# Patient Record
Sex: FEMALE | Race: OTHER | NOT HISPANIC OR LATINO | URBAN - METROPOLITAN AREA
[De-identification: names, ages, dates, MRNs, and addresses within clinical notes are randomized per-mention and may not be internally consistent; named-entity substitution may affect disease eponyms.]

---

## 2017-12-05 ENCOUNTER — EMERGENCY (EMERGENCY)
Facility: HOSPITAL | Age: 27
LOS: 1 days | Discharge: HOME | End: 2017-12-05
Attending: EMERGENCY MEDICINE | Admitting: EMERGENCY MEDICINE
Payer: COMMERCIAL

## 2017-12-05 VITALS
SYSTOLIC BLOOD PRESSURE: 130 MMHG | DIASTOLIC BLOOD PRESSURE: 77 MMHG | WEIGHT: 121.25 LBS | OXYGEN SATURATION: 99 % | HEART RATE: 105 BPM | TEMPERATURE: 98 F | RESPIRATION RATE: 18 BRPM

## 2017-12-05 VITALS
TEMPERATURE: 98 F | SYSTOLIC BLOOD PRESSURE: 109 MMHG | OXYGEN SATURATION: 99 % | DIASTOLIC BLOOD PRESSURE: 68 MMHG | RESPIRATION RATE: 18 BRPM | HEART RATE: 80 BPM

## 2017-12-05 DIAGNOSIS — O20.9 HEMORRHAGE IN EARLY PREGNANCY, UNSPECIFIED: ICD-10-CM

## 2017-12-05 DIAGNOSIS — O46.8X1 OTHER ANTEPARTUM HEMORRHAGE, FIRST TRIMESTER: ICD-10-CM

## 2017-12-05 DIAGNOSIS — Z3A.08 8 WEEKS GESTATION OF PREGNANCY: ICD-10-CM

## 2017-12-05 LAB
ALBUMIN SERPL ELPH-MCNC: 4.1 G/DL — SIGNIFICANT CHANGE UP (ref 3.3–5)
ALP SERPL-CCNC: 80 U/L — SIGNIFICANT CHANGE UP (ref 40–120)
ALT FLD-CCNC: 9 U/L — LOW (ref 10–45)
ANION GAP SERPL CALC-SCNC: 16 MMOL/L — SIGNIFICANT CHANGE UP (ref 5–17)
APPEARANCE UR: (no result)
APTT BLD: 29.6 SEC — SIGNIFICANT CHANGE UP (ref 27.5–37.4)
AST SERPL-CCNC: 17 U/L — SIGNIFICANT CHANGE UP (ref 10–40)
BASOPHILS NFR BLD AUTO: 0.1 % — SIGNIFICANT CHANGE UP (ref 0–2)
BILIRUB SERPL-MCNC: 0.3 MG/DL — SIGNIFICANT CHANGE UP (ref 0.2–1.2)
BILIRUB UR-MCNC: NEGATIVE — SIGNIFICANT CHANGE UP
BLD GP AB SCN SERPL QL: NEGATIVE — SIGNIFICANT CHANGE UP
BUN SERPL-MCNC: 8 MG/DL — SIGNIFICANT CHANGE UP (ref 7–23)
CALCIUM SERPL-MCNC: 9.6 MG/DL — SIGNIFICANT CHANGE UP (ref 8.4–10.5)
CHLORIDE SERPL-SCNC: 100 MMOL/L — SIGNIFICANT CHANGE UP (ref 96–108)
CO2 SERPL-SCNC: 19 MMOL/L — LOW (ref 22–31)
COLOR SPEC: (no result)
CREAT SERPL-MCNC: 0.41 MG/DL — LOW (ref 0.5–1.3)
DIFF PNL FLD: (no result)
EOSINOPHIL NFR BLD AUTO: 1.2 % — SIGNIFICANT CHANGE UP (ref 0–6)
GLUCOSE SERPL-MCNC: 72 MG/DL — SIGNIFICANT CHANGE UP (ref 70–99)
GLUCOSE UR QL: NEGATIVE — SIGNIFICANT CHANGE UP
HCG SERPL-ACNC: HIGH MIU/ML
HCT VFR BLD CALC: 36.6 % — SIGNIFICANT CHANGE UP (ref 34.5–45)
HGB BLD-MCNC: 12.2 G/DL — SIGNIFICANT CHANGE UP (ref 11.5–15.5)
INR BLD: 0.91 — SIGNIFICANT CHANGE UP (ref 0.88–1.16)
KETONES UR-MCNC: NEGATIVE — SIGNIFICANT CHANGE UP
LEUKOCYTE ESTERASE UR-ACNC: NEGATIVE — SIGNIFICANT CHANGE UP
LYMPHOCYTES # BLD AUTO: 20.5 % — SIGNIFICANT CHANGE UP (ref 13–44)
MCHC RBC-ENTMCNC: 30.1 PG — SIGNIFICANT CHANGE UP (ref 27–34)
MCHC RBC-ENTMCNC: 33.3 G/DL — SIGNIFICANT CHANGE UP (ref 32–36)
MCV RBC AUTO: 90.4 FL — SIGNIFICANT CHANGE UP (ref 80–100)
MONOCYTES NFR BLD AUTO: 7 % — SIGNIFICANT CHANGE UP (ref 2–14)
NEUTROPHILS NFR BLD AUTO: 71.2 % — SIGNIFICANT CHANGE UP (ref 43–77)
NITRITE UR-MCNC: NEGATIVE — SIGNIFICANT CHANGE UP
PH UR: 6 — SIGNIFICANT CHANGE UP (ref 5–8)
PLATELET # BLD AUTO: 190 K/UL — SIGNIFICANT CHANGE UP (ref 150–400)
POTASSIUM SERPL-MCNC: 4.3 MMOL/L — SIGNIFICANT CHANGE UP (ref 3.5–5.3)
POTASSIUM SERPL-SCNC: 4.3 MMOL/L — SIGNIFICANT CHANGE UP (ref 3.5–5.3)
PROT SERPL-MCNC: 7.7 G/DL — SIGNIFICANT CHANGE UP (ref 6–8.3)
PROT UR-MCNC: >=300 MG/DL
PROTHROM AB SERPL-ACNC: 10.1 SEC — SIGNIFICANT CHANGE UP (ref 9.8–12.7)
RBC # BLD: 4.05 M/UL — SIGNIFICANT CHANGE UP (ref 3.8–5.2)
RBC # FLD: 12.9 % — SIGNIFICANT CHANGE UP (ref 10.3–16.9)
RH IG SCN BLD-IMP: POSITIVE — SIGNIFICANT CHANGE UP
SODIUM SERPL-SCNC: 135 MMOL/L — SIGNIFICANT CHANGE UP (ref 135–145)
SP GR SPEC: 1.02 — SIGNIFICANT CHANGE UP (ref 1–1.03)
UROBILINOGEN FLD QL: 0.2 E.U./DL — SIGNIFICANT CHANGE UP
WBC # BLD: 16.9 K/UL — HIGH (ref 3.8–10.5)
WBC # FLD AUTO: 16.9 K/UL — HIGH (ref 3.8–10.5)

## 2017-12-05 PROCEDURE — 85610 PROTHROMBIN TIME: CPT

## 2017-12-05 PROCEDURE — 76817 TRANSVAGINAL US OBSTETRIC: CPT | Mod: 26

## 2017-12-05 PROCEDURE — 76801 OB US < 14 WKS SINGLE FETUS: CPT

## 2017-12-05 PROCEDURE — 99285 EMERGENCY DEPT VISIT HI MDM: CPT

## 2017-12-05 PROCEDURE — 87086 URINE CULTURE/COLONY COUNT: CPT

## 2017-12-05 PROCEDURE — 86900 BLOOD TYPING SEROLOGIC ABO: CPT

## 2017-12-05 PROCEDURE — 76801 OB US < 14 WKS SINGLE FETUS: CPT | Mod: 26,59

## 2017-12-05 PROCEDURE — 85730 THROMBOPLASTIN TIME PARTIAL: CPT

## 2017-12-05 PROCEDURE — 85025 COMPLETE CBC W/AUTO DIFF WBC: CPT

## 2017-12-05 PROCEDURE — 84702 CHORIONIC GONADOTROPIN TEST: CPT

## 2017-12-05 PROCEDURE — 80053 COMPREHEN METABOLIC PANEL: CPT

## 2017-12-05 PROCEDURE — 36415 COLL VENOUS BLD VENIPUNCTURE: CPT

## 2017-12-05 PROCEDURE — 86901 BLOOD TYPING SEROLOGIC RH(D): CPT

## 2017-12-05 PROCEDURE — 76817 TRANSVAGINAL US OBSTETRIC: CPT

## 2017-12-05 PROCEDURE — 81001 URINALYSIS AUTO W/SCOPE: CPT

## 2017-12-05 PROCEDURE — 99284 EMERGENCY DEPT VISIT MOD MDM: CPT | Mod: 25

## 2017-12-05 PROCEDURE — 86850 RBC ANTIBODY SCREEN: CPT

## 2017-12-05 NOTE — ED PROVIDER NOTE - OBJECTIVE STATEMENT
25 y/o female who is  with estimated 8 weeks GA is present with vaginal bleeding x1 day. Pt states that she went to the bathroom earlier today and noticed a large blood clot in the toilet. Pt denies abdominal pain, pelvic pain, recent trauma, sexual intercourse today. Denies past hx of blood clots or fam hx of blood clots.

## 2017-12-05 NOTE — ED PROVIDER NOTE - ATTENDING CONTRIBUTION TO CARE
27 yo  with vaginal bleeding, no abd pain, syncope, lightheadedness, recent sexual intercourse. NAD, RRR, CTAB, no abdominal tenderness, no focal neuro deficits. Labs, US reviewed, currently hds, advised close fu with gyn regarding results, small sc hematoma likely cause of bleeding,  pelvic rest advised, Discussed with pt results of work up, strict return precautions, and need for follow up.  Pt expressed understanding and agrees with plan.

## 2017-12-05 NOTE — ED PROVIDER NOTE - CARE PLAN
Principal Discharge DX:	Subchorionic hemorrhage  Instructions for follow-up, activity and diet:	Please refrain from strenuous physical activities, pelvic rest and follow up with Dr. Armstrong

## 2017-12-05 NOTE — ED ADULT NURSE NOTE - OBJECTIVE STATEMENT
states vaginal bleeding since this morning. denies abd pain.  states she is 8 weeks pregnant.  +hematuria noted.  denies dizziness, chest pain, palpitations, sob.

## 2017-12-05 NOTE — ED PROVIDER NOTE - MEDICAL DECISION MAKING DETAILS
25 y/o female with vaginal bleeding. US shows subchorionic hemorrhage. Spoke with PA at Dr. Armstrong' office. Pt is safe for dc with fu with OBGYN fu.

## 2017-12-06 LAB
CULTURE RESULTS: NO GROWTH — SIGNIFICANT CHANGE UP
SPECIMEN SOURCE: SIGNIFICANT CHANGE UP

## 2017-12-07 ENCOUNTER — EMERGENCY (EMERGENCY)
Facility: HOSPITAL | Age: 27
LOS: 1 days | Discharge: HOME | End: 2017-12-07
Attending: EMERGENCY MEDICINE | Admitting: EMERGENCY MEDICINE
Payer: COMMERCIAL

## 2017-12-07 VITALS
HEART RATE: 89 BPM | TEMPERATURE: 98 F | DIASTOLIC BLOOD PRESSURE: 68 MMHG | OXYGEN SATURATION: 99 % | RESPIRATION RATE: 16 BRPM | SYSTOLIC BLOOD PRESSURE: 111 MMHG

## 2017-12-07 VITALS
DIASTOLIC BLOOD PRESSURE: 73 MMHG | HEART RATE: 97 BPM | RESPIRATION RATE: 20 BRPM | TEMPERATURE: 98 F | OXYGEN SATURATION: 98 % | SYSTOLIC BLOOD PRESSURE: 104 MMHG

## 2017-12-07 DIAGNOSIS — O20.9 HEMORRHAGE IN EARLY PREGNANCY, UNSPECIFIED: ICD-10-CM

## 2017-12-07 DIAGNOSIS — N93.9 ABNORMAL UTERINE AND VAGINAL BLEEDING, UNSPECIFIED: ICD-10-CM

## 2017-12-07 DIAGNOSIS — Z3A.12 12 WEEKS GESTATION OF PREGNANCY: ICD-10-CM

## 2017-12-07 LAB
ALBUMIN SERPL ELPH-MCNC: 3.9 G/DL — SIGNIFICANT CHANGE UP (ref 3.3–5)
ALP SERPL-CCNC: 73 U/L — SIGNIFICANT CHANGE UP (ref 40–120)
ALT FLD-CCNC: 9 U/L — LOW (ref 10–45)
ANION GAP SERPL CALC-SCNC: 14 MMOL/L — SIGNIFICANT CHANGE UP (ref 5–17)
APTT BLD: 29 SEC — SIGNIFICANT CHANGE UP (ref 27.5–37.4)
AST SERPL-CCNC: 14 U/L — SIGNIFICANT CHANGE UP (ref 10–40)
BASOPHILS NFR BLD AUTO: 0.1 % — SIGNIFICANT CHANGE UP (ref 0–2)
BILIRUB SERPL-MCNC: 0.3 MG/DL — SIGNIFICANT CHANGE UP (ref 0.2–1.2)
BLD GP AB SCN SERPL QL: NEGATIVE — SIGNIFICANT CHANGE UP
BUN SERPL-MCNC: 7 MG/DL — SIGNIFICANT CHANGE UP (ref 7–23)
CALCIUM SERPL-MCNC: 9.4 MG/DL — SIGNIFICANT CHANGE UP (ref 8.4–10.5)
CHLORIDE SERPL-SCNC: 99 MMOL/L — SIGNIFICANT CHANGE UP (ref 96–108)
CO2 SERPL-SCNC: 20 MMOL/L — LOW (ref 22–31)
CREAT SERPL-MCNC: 0.43 MG/DL — LOW (ref 0.5–1.3)
EOSINOPHIL NFR BLD AUTO: 1.2 % — SIGNIFICANT CHANGE UP (ref 0–6)
GLUCOSE SERPL-MCNC: 92 MG/DL — SIGNIFICANT CHANGE UP (ref 70–99)
HCG SERPL-ACNC: HIGH MIU/ML
HCT VFR BLD CALC: 32.8 % — LOW (ref 34.5–45)
HGB BLD-MCNC: 11.1 G/DL — LOW (ref 11.5–15.5)
INR BLD: 0.92 — SIGNIFICANT CHANGE UP (ref 0.88–1.16)
LYMPHOCYTES # BLD AUTO: 17.8 % — SIGNIFICANT CHANGE UP (ref 13–44)
MCHC RBC-ENTMCNC: 30.2 PG — SIGNIFICANT CHANGE UP (ref 27–34)
MCHC RBC-ENTMCNC: 33.8 G/DL — SIGNIFICANT CHANGE UP (ref 32–36)
MCV RBC AUTO: 89.4 FL — SIGNIFICANT CHANGE UP (ref 80–100)
MONOCYTES NFR BLD AUTO: 6.5 % — SIGNIFICANT CHANGE UP (ref 2–14)
NEUTROPHILS NFR BLD AUTO: 74.4 % — SIGNIFICANT CHANGE UP (ref 43–77)
PLATELET # BLD AUTO: 190 K/UL — SIGNIFICANT CHANGE UP (ref 150–400)
POTASSIUM SERPL-MCNC: 4.2 MMOL/L — SIGNIFICANT CHANGE UP (ref 3.5–5.3)
POTASSIUM SERPL-SCNC: 4.2 MMOL/L — SIGNIFICANT CHANGE UP (ref 3.5–5.3)
PROT SERPL-MCNC: 7.1 G/DL — SIGNIFICANT CHANGE UP (ref 6–8.3)
PROTHROM AB SERPL-ACNC: 10.2 SEC — SIGNIFICANT CHANGE UP (ref 9.8–12.7)
RBC # BLD: 3.67 M/UL — LOW (ref 3.8–5.2)
RBC # FLD: 13 % — SIGNIFICANT CHANGE UP (ref 10.3–16.9)
RH IG SCN BLD-IMP: POSITIVE — SIGNIFICANT CHANGE UP
SODIUM SERPL-SCNC: 133 MMOL/L — LOW (ref 135–145)
WBC # BLD: 16.2 K/UL — HIGH (ref 3.8–10.5)
WBC # FLD AUTO: 16.2 K/UL — HIGH (ref 3.8–10.5)

## 2017-12-07 PROCEDURE — 80053 COMPREHEN METABOLIC PANEL: CPT

## 2017-12-07 PROCEDURE — 86901 BLOOD TYPING SEROLOGIC RH(D): CPT

## 2017-12-07 PROCEDURE — 85730 THROMBOPLASTIN TIME PARTIAL: CPT

## 2017-12-07 PROCEDURE — 99284 EMERGENCY DEPT VISIT MOD MDM: CPT | Mod: 25

## 2017-12-07 PROCEDURE — 86850 RBC ANTIBODY SCREEN: CPT

## 2017-12-07 PROCEDURE — 85025 COMPLETE CBC W/AUTO DIFF WBC: CPT

## 2017-12-07 PROCEDURE — 85610 PROTHROMBIN TIME: CPT

## 2017-12-07 PROCEDURE — 84702 CHORIONIC GONADOTROPIN TEST: CPT

## 2017-12-07 PROCEDURE — 36415 COLL VENOUS BLD VENIPUNCTURE: CPT

## 2017-12-07 PROCEDURE — 99053 MED SERV 10PM-8AM 24 HR FAC: CPT

## 2017-12-07 PROCEDURE — 86900 BLOOD TYPING SEROLOGIC ABO: CPT

## 2017-12-07 RX ORDER — ACETAMINOPHEN 500 MG
650 TABLET ORAL ONCE
Qty: 0 | Refills: 0 | Status: COMPLETED | OUTPATIENT
Start: 2017-12-07 | End: 2017-12-07

## 2017-12-07 RX ADMIN — Medication 650 MILLIGRAM(S): at 01:54

## 2017-12-07 NOTE — ED PROVIDER NOTE - OBJECTIVE STATEMENT
26F , 12wks pregnant c/o vaginal bleeding.  some blood clots and mild lower abd cramping.  no fevers. vomited once.  no trauma.  was seen in ED yesterday - had US showing live IUP and mall to moderate subchorionic hematoma.

## 2017-12-07 NOTE — ED PROVIDER NOTE - PROGRESS NOTE DETAILS
bedside education US - transabdominal - +live IUP, +, +fetal movement slight drop in HCG.  spoke with pt regarding that subchorionic hemorrhage places increased risk of miscarriage. advised pelvic rest, no heavy lifting. pt has Ob appt tomorrow.   I have discussed the discharge plan with the patient. The patient agrees with the plan, as discussed.  The patient understands Emergency Department diagnosis is a preliminary diagnosis often based on limited information and that the patient must adhere to the follow-up plan as discussed.  The patient understands that if the symptoms worsen the patient may return to the Emergency Department at any time for further evaluation and treatment.

## 2017-12-07 NOTE — ED ADULT TRIAGE NOTE - CHIEF COMPLAINT QUOTE
pt is 3 months pregnant pt c/o vaginal bleeding since yesterday pt was seen here last night and was diagnosed with subchorionic hematoma , today bleeding is heavier , pt also c/o nausea and had one episode of vomiting, pt also c/o weakness

## 2017-12-11 ENCOUNTER — EMERGENCY (EMERGENCY)
Facility: HOSPITAL | Age: 27
LOS: 1 days | Discharge: HOME | End: 2017-12-11
Attending: EMERGENCY MEDICINE | Admitting: EMERGENCY MEDICINE
Payer: COMMERCIAL

## 2017-12-11 VITALS
DIASTOLIC BLOOD PRESSURE: 67 MMHG | SYSTOLIC BLOOD PRESSURE: 119 MMHG | TEMPERATURE: 98 F | HEART RATE: 85 BPM | OXYGEN SATURATION: 100 % | RESPIRATION RATE: 18 BRPM

## 2017-12-11 VITALS
SYSTOLIC BLOOD PRESSURE: 123 MMHG | RESPIRATION RATE: 18 BRPM | OXYGEN SATURATION: 100 % | HEART RATE: 110 BPM | DIASTOLIC BLOOD PRESSURE: 70 MMHG | TEMPERATURE: 98 F

## 2017-12-11 PROCEDURE — 99053 MED SERV 10PM-8AM 24 HR FAC: CPT

## 2017-12-11 PROCEDURE — 99284 EMERGENCY DEPT VISIT MOD MDM: CPT | Mod: 25

## 2017-12-11 NOTE — ED PROVIDER NOTE - MEDICAL DECISION MAKING DETAILS
discussed that symptoms again represent threatened ab and are related to subchorionic hematoma  unfortunately, no intervention is available to change course at this time but reinterated rest and nothing intravaginal.  .  return for increased symptoms but expect continued spotting and intermittent discomfort.  rh+ and recent neg urine culture.  hemodynamically stable.

## 2017-12-11 NOTE — ED ADULT NURSE NOTE - OBJECTIVE STATEMENT
Pt presents to ED c/o vaginal bleeding x 3 hours. Pt presents to ED c/o abd pain x 3 hours. Pt states she is 3 months pregnant. She recently has been diagnosed with pocket of blood on placenta. Pt states she expected to have vaginal bleeding.

## 2017-12-11 NOTE — ED ADULT TRIAGE NOTE - CHIEF COMPLAINT QUOTE
vaginal bleeding / abdominal pain for 3 hours .pt was seen and d/marie 12/7/ 17 DX  with Threatened Miscarriage. vaginal bleeding / abdominal pain for 3 hours .pt was seen and d/marie 12/7/ 17 DX  with Threatened Miscarriage. pt is 3 month pregnant .

## 2017-12-11 NOTE — ED PROVIDER NOTE - OBJECTIVE STATEMENT
[place] :   at 12+ weeks here with continued vaginal spotting and lower abd discomfort.  Known iup with subchorionic hematoma on prior us.  Seen here twice in past week for similar but states lower abd discomfort felt new/different.  Bleeding unchanged.  Followed up with her obgyn after last visit.  Wants to know if there is anything she can take for discomfort.  No fever or urinary symptoms.

## 2017-12-11 NOTE — ED ADULT NURSE NOTE - CHIEF COMPLAINT QUOTE
vaginal bleeding / abdominal pain for 3 hours .pt was seen and d/marie 12/7/ 17 DX  with Threatened Miscarriage. pt is 3 month pregnant .

## 2017-12-12 ENCOUNTER — EMERGENCY (EMERGENCY)
Facility: HOSPITAL | Age: 27
LOS: 1 days | Discharge: HOME | End: 2017-12-12
Attending: EMERGENCY MEDICINE | Admitting: EMERGENCY MEDICINE
Payer: COMMERCIAL

## 2017-12-12 VITALS
DIASTOLIC BLOOD PRESSURE: 71 MMHG | SYSTOLIC BLOOD PRESSURE: 127 MMHG | HEART RATE: 109 BPM | RESPIRATION RATE: 19 BRPM | OXYGEN SATURATION: 98 % | TEMPERATURE: 98 F

## 2017-12-12 VITALS
TEMPERATURE: 98 F | HEART RATE: 83 BPM | DIASTOLIC BLOOD PRESSURE: 68 MMHG | RESPIRATION RATE: 18 BRPM | SYSTOLIC BLOOD PRESSURE: 109 MMHG | OXYGEN SATURATION: 98 %

## 2017-12-12 DIAGNOSIS — Z79.899 OTHER LONG TERM (CURRENT) DRUG THERAPY: ICD-10-CM

## 2017-12-12 DIAGNOSIS — O20.9 HEMORRHAGE IN EARLY PREGNANCY, UNSPECIFIED: ICD-10-CM

## 2017-12-12 DIAGNOSIS — O03.9 COMPLETE OR UNSPECIFIED SPONTANEOUS ABORTION WITHOUT COMPLICATION: ICD-10-CM

## 2017-12-12 LAB
ALBUMIN SERPL ELPH-MCNC: 3.9 G/DL — SIGNIFICANT CHANGE UP (ref 3.3–5)
ALP SERPL-CCNC: 106 U/L — SIGNIFICANT CHANGE UP (ref 40–120)
ALT FLD-CCNC: 16 U/L — SIGNIFICANT CHANGE UP (ref 10–45)
ANION GAP SERPL CALC-SCNC: 16 MMOL/L — SIGNIFICANT CHANGE UP (ref 5–17)
APTT BLD: 29.4 SEC — SIGNIFICANT CHANGE UP (ref 27.5–37.4)
AST SERPL-CCNC: 23 U/L — SIGNIFICANT CHANGE UP (ref 10–40)
BASOPHILS NFR BLD AUTO: 0 % — SIGNIFICANT CHANGE UP (ref 0–2)
BILIRUB SERPL-MCNC: 0.4 MG/DL — SIGNIFICANT CHANGE UP (ref 0.2–1.2)
BLD GP AB SCN SERPL QL: NEGATIVE — SIGNIFICANT CHANGE UP
BUN SERPL-MCNC: 7 MG/DL — SIGNIFICANT CHANGE UP (ref 7–23)
CALCIUM SERPL-MCNC: 9.7 MG/DL — SIGNIFICANT CHANGE UP (ref 8.4–10.5)
CHLORIDE SERPL-SCNC: 93 MMOL/L — LOW (ref 96–108)
CO2 SERPL-SCNC: 20 MMOL/L — LOW (ref 22–31)
CREAT SERPL-MCNC: 0.45 MG/DL — LOW (ref 0.5–1.3)
EOSINOPHIL NFR BLD AUTO: 0.1 % — SIGNIFICANT CHANGE UP (ref 0–6)
GLUCOSE SERPL-MCNC: 90 MG/DL — SIGNIFICANT CHANGE UP (ref 70–99)
HCG SERPL-ACNC: HIGH MIU/ML
HCT VFR BLD CALC: 32 % — LOW (ref 34.5–45)
HGB BLD-MCNC: 11.1 G/DL — LOW (ref 11.5–15.5)
INR BLD: 1.01 — SIGNIFICANT CHANGE UP (ref 0.88–1.16)
LYMPHOCYTES # BLD AUTO: 6.7 % — LOW (ref 13–44)
MCHC RBC-ENTMCNC: 30.7 PG — SIGNIFICANT CHANGE UP (ref 27–34)
MCHC RBC-ENTMCNC: 34.7 G/DL — SIGNIFICANT CHANGE UP (ref 32–36)
MCV RBC AUTO: 88.6 FL — SIGNIFICANT CHANGE UP (ref 80–100)
MONOCYTES NFR BLD AUTO: 6 % — SIGNIFICANT CHANGE UP (ref 2–14)
NEUTROPHILS NFR BLD AUTO: 87.2 % — HIGH (ref 43–77)
PLATELET # BLD AUTO: 204 K/UL — SIGNIFICANT CHANGE UP (ref 150–400)
POTASSIUM SERPL-MCNC: 3.8 MMOL/L — SIGNIFICANT CHANGE UP (ref 3.5–5.3)
POTASSIUM SERPL-SCNC: 3.8 MMOL/L — SIGNIFICANT CHANGE UP (ref 3.5–5.3)
PROT SERPL-MCNC: 7.4 G/DL — SIGNIFICANT CHANGE UP (ref 6–8.3)
PROTHROM AB SERPL-ACNC: 11.2 SEC — SIGNIFICANT CHANGE UP (ref 9.8–12.7)
RBC # BLD: 3.61 M/UL — LOW (ref 3.8–5.2)
RBC # FLD: 13 % — SIGNIFICANT CHANGE UP (ref 10.3–16.9)
RH IG SCN BLD-IMP: POSITIVE — SIGNIFICANT CHANGE UP
SODIUM SERPL-SCNC: 129 MMOL/L — LOW (ref 135–145)
WBC # BLD: 27.6 K/UL — HIGH (ref 3.8–10.5)
WBC # FLD AUTO: 27.6 K/UL — HIGH (ref 3.8–10.5)

## 2017-12-12 PROCEDURE — 86850 RBC ANTIBODY SCREEN: CPT

## 2017-12-12 PROCEDURE — 96375 TX/PRO/DX INJ NEW DRUG ADDON: CPT

## 2017-12-12 PROCEDURE — 86900 BLOOD TYPING SEROLOGIC ABO: CPT

## 2017-12-12 PROCEDURE — 86901 BLOOD TYPING SEROLOGIC RH(D): CPT

## 2017-12-12 PROCEDURE — 85610 PROTHROMBIN TIME: CPT

## 2017-12-12 PROCEDURE — 96374 THER/PROPH/DIAG INJ IV PUSH: CPT

## 2017-12-12 PROCEDURE — 99284 EMERGENCY DEPT VISIT MOD MDM: CPT | Mod: 25

## 2017-12-12 PROCEDURE — 85025 COMPLETE CBC W/AUTO DIFF WBC: CPT

## 2017-12-12 PROCEDURE — 85730 THROMBOPLASTIN TIME PARTIAL: CPT

## 2017-12-12 PROCEDURE — 99285 EMERGENCY DEPT VISIT HI MDM: CPT | Mod: 25

## 2017-12-12 PROCEDURE — 99053 MED SERV 10PM-8AM 24 HR FAC: CPT

## 2017-12-12 PROCEDURE — 36415 COLL VENOUS BLD VENIPUNCTURE: CPT

## 2017-12-12 PROCEDURE — 84702 CHORIONIC GONADOTROPIN TEST: CPT

## 2017-12-12 PROCEDURE — 80053 COMPREHEN METABOLIC PANEL: CPT

## 2017-12-12 PROCEDURE — 87070 CULTURE OTHR SPECIMN AEROBIC: CPT

## 2017-12-12 RX ORDER — MORPHINE SULFATE 50 MG/1
2 CAPSULE, EXTENDED RELEASE ORAL ONCE
Qty: 0 | Refills: 0 | Status: DISCONTINUED | OUTPATIENT
Start: 2017-12-12 | End: 2017-12-12

## 2017-12-12 RX ORDER — ACETAMINOPHEN 500 MG
650 TABLET ORAL ONCE
Qty: 0 | Refills: 0 | Status: COMPLETED | OUTPATIENT
Start: 2017-12-12 | End: 2017-12-12

## 2017-12-12 RX ORDER — SODIUM CHLORIDE 9 MG/ML
1000 INJECTION INTRAMUSCULAR; INTRAVENOUS; SUBCUTANEOUS ONCE
Qty: 0 | Refills: 0 | Status: COMPLETED | OUTPATIENT
Start: 2017-12-12 | End: 2017-12-12

## 2017-12-12 RX ORDER — ONDANSETRON 8 MG/1
4 TABLET, FILM COATED ORAL ONCE
Qty: 0 | Refills: 0 | Status: COMPLETED | OUTPATIENT
Start: 2017-12-12 | End: 2017-12-12

## 2017-12-12 RX ORDER — ACETAMINOPHEN 500 MG
325 TABLET ORAL ONCE
Qty: 0 | Refills: 0 | Status: COMPLETED | OUTPATIENT
Start: 2017-12-12 | End: 2017-12-12

## 2017-12-12 RX ADMIN — ONDANSETRON 4 MILLIGRAM(S): 8 TABLET, FILM COATED ORAL at 01:57

## 2017-12-12 RX ADMIN — SODIUM CHLORIDE 2000 MILLILITER(S): 9 INJECTION INTRAMUSCULAR; INTRAVENOUS; SUBCUTANEOUS at 01:57

## 2017-12-12 RX ADMIN — Medication 325 MILLIGRAM(S): at 03:15

## 2017-12-12 RX ADMIN — SODIUM CHLORIDE 2000 MILLILITER(S): 9 INJECTION INTRAMUSCULAR; INTRAVENOUS; SUBCUTANEOUS at 03:20

## 2017-12-12 RX ADMIN — Medication 650 MILLIGRAM(S): at 01:58

## 2017-12-12 RX ADMIN — Medication 325 MILLIGRAM(S): at 05:24

## 2017-12-12 RX ADMIN — MORPHINE SULFATE 2 MILLIGRAM(S): 50 CAPSULE, EXTENDED RELEASE ORAL at 03:50

## 2017-12-12 RX ADMIN — Medication 650 MILLIGRAM(S): at 03:21

## 2017-12-12 NOTE — ED PROVIDER NOTE - ATTENDING CONTRIBUTION TO CARE
26F , 12wks, c/o worsening lower abd pain and cramping, +bleeding. pt with 3 visits prior showing subchorionic hemorrhage.    gen- nad  heent- ncat, clear conj  cv -rrr  lungs -ctab  abd - soft, suprapubic ttp  ext -wwp, no edema  neuro -aox3, steady gait, rubalcava  while in ED - pt with spontaneous passage of fetus and placenta. given ABX, will f/u with Ob

## 2017-12-12 NOTE — CONSULT NOTE ADULT - SUBJECTIVE AND OBJECTIVE BOX
26y             PAST MEDICAL & SURGICAL HISTORY:  No pertinent past medical history  No significant past surgical history      REVIEW OF SYSTEMS    neg except for hpi.     MEDICATIONS  (STANDING):  acetaminophen   Tablet. 325 milliGRAM(s) Oral once    MEDICATIONS  (PRN):      Allergies    No Known Allergies    Intolerances        SOCIAL HISTORY:    FAMILY HISTORY:      Vital Signs Last 24 Hrs  T(C): 36.6 (12 Dec 2017 00:48), Max: 36.9 (11 Dec 2017 06:36)  T(F): 97.9 (12 Dec 2017 00:48), Max: 98.5 (11 Dec 2017 06:36)  HR: 109 (12 Dec 2017 00:48) (85 - 110)  BP: 127/71 (12 Dec 2017 00:48) (119/67 - 127/71)  BP(mean): --  RR: 19 (12 Dec 2017 00:48) (18 - 19)  SpO2: 98% (12 Dec 2017 00:48) (98% - 100%)    Gen: NAD, AOx3  Chest: normal work of breathing  Abdomen: soft, nontender, nondistended, no rebound or guarding  Pelvic: cervix grossly normal, long and closed, no bleeding, normal vaginal discharge, no CMT or adnexal masses  Extremities: [facility]    LABS:                        11.1   27.6  )-----------( 204      ( 12 Dec 2017 01:43 )             32.0         129<L>  |  93<L>  |  7   ----------------------------<  90  3.8   |  20<L>  |  0.45<L>    Ca    9.7      12 Dec 2017 01:43    TPro  7.4  /  Alb  3.9  /  TBili  0.4  /  DBili  x   /  AST  23  /  ALT  16  /  AlkPhos  106  12-12    PT/INR - ( 12 Dec 2017 01:43 )   PT: 11.2 sec;   INR: 1.01          PTT - ( 12 Dec 2017 01:43 )  PTT:29.4 sec      RADIOLOGY & ADDITIONAL STUDIES: 26y  [id number] presents for increased vaginal bleeding and pelvic pain. Patient has come to the ER 3 times over the last week and visited her Ob yesterday, was told to expect continued bleeding. An ultrasound showed a subchorionic hemorrhage, small. Patient states the pain is much worse today, feels like contractions. Did not take anything for pain. +Nausea and vomiting for the past month. +Subjective fever today. Denies diarrhea, dysuria, abnormal vaginal discharge.     In the ER, patient passed fetus, placenta found in vagina and removed.    Obhx: first pregnancy  Gynhx: denies  PMH: denies  PSH: denies  Meds: pnv  NKDA  Sochx: denies tobacco, alcohol, drugs       PAST MEDICAL & SURGICAL HISTORY:  No pertinent past medical history  No significant past surgical history      REVIEW OF SYSTEMS    neg except for hpi.     MEDICATIONS  (STANDING):  acetaminophen   Tablet. 325 milliGRAM(s) Oral once    MEDICATIONS  (PRN):      Allergies    No Known Allergies    Intolerances        Vital Signs Last 24 Hrs  T(C): 36.6 (12 Dec 2017 00:48), Max: 36.9 (11 Dec 2017 06:36)  T(F): 97.9 (12 Dec 2017 00:48), Max: 98.5 (11 Dec 2017 06:36)  HR: 109 (12 Dec 2017 00:48) (85 - 110)  BP: 127/71 (12 Dec 2017 00:48) (119/67 - 127/71)  BP(mean): --  RR: 19 (12 Dec 2017 00:48) (18 - 19)  SpO2: 98% (12 Dec 2017 00:48) (98% - 100%)    Gen: NAD, AOx3  Chest: normal work of breathing  Abdomen: soft, tender bilateral lower quadrants, no rebound or guarding, nondistended  Pelvic: small blood clot, placenta sitting in vagina and manually removed with ease, minimal bleeding after removal of placenta  Extremities: [facility]  Bedside sono prior to delivery did not show an FH    LABS:                        11.1   27.6  )-----------( 204      ( 12 Dec 2017 01:43 )             32.0     12-    129<L>  |  93<L>  |  7   ----------------------------<  90  3.8   |  20<L>  |  0.45<L>    Ca    9.7      12 Dec 2017 01:43    TPro  7.4  /  Alb  3.9  /  TBili  0.4  /  DBili  x   /  AST  23  /  ALT  16  /  AlkPhos  106  12-12    PT/INR - ( 12 Dec 2017 01:43 )   PT: 11.2 sec;   INR: 1.01          PTT - ( 12 Dec 2017 01:43 )  PTT:29.4 sec    beta hCG [id number]

## 2017-12-12 NOTE — ED PROVIDER NOTE - OBJECTIVE STATEMENT
25 yo  @ 13 weeks gestation c/o worsening pelvic pain since last night. Associated with vaginal bleeding x 1 week and n/v. Pt seen in the ED on  and diagnosed with a subchorionic hemorrhage but had an IUP with a +FH. Pt seen again on  and  and there was a FH both times. Pt states pain is worsening. Denies fever, chills, diarrhea, back pain, dysuria.

## 2017-12-12 NOTE — ED PROVIDER NOTE - PROGRESS NOTE DETAILS
pt c/o severe cramping, pelvic exam performed again, Os open, uterus contracted. About 15 min later pt passed a formed fetus. Cord clamped and cut and POC saved. Gyn delivered the placenta. Recommends augmentin 875 BID x 2 weeks. Pt will f/u with her gyn.

## 2017-12-12 NOTE — ED PROVIDER NOTE - PHYSICAL EXAMINATION
CONSTITUTIONAL: pt lying in stretcher appears uncomfortable.   HEAD: Normocephalic; atraumatic.   EYES: PERRL; EOM intact; conjunctiva and sclera clear  ENT: normal nose; no rhinorrhea; normal pharynx with no erythema or lesions.   NECK: Supple; non-tender; no LAD  CARDIOVASCULAR: Normal S1, S2; no murmurs, rubs, or gallops. Regular rate and rhythm.   RESPIRATORY: Breathing easily; breath sounds clear and equal bilaterally; no wheezes, rhonchi, or rales.  GI: Soft; non-distended; non-tender; no palpable organomegaly. +suprapubic ttp   pelvic exam- deferred to gyn   MSK: FROM at all extremities, normal tone   EXT: No cyanosis or edema; N/V intact  SKIN: Normal for age and race; warm; dry; good turgor; no apparent lesions or rash.   NEURO: A & O x 3; face symmetric; grossly unremarkable.   PSYCHOLOGICAL: The patient’s mood and manner are appropriate.

## 2017-12-12 NOTE — CONSULT NOTE ADULT - ASSESSMENT
27 yo  s/p complete AB at 12w4d by LMP.  -Patient declined cytogenetics, products discarded  -WBC 27.6, afebrile. Will treat w/ augmentin x14 d per Dr. Welsh  -f/u with obgyn in next few days, call office today  -Return immediately to the hospital if heavy bleeding, fever, or severe pain    discussed with Dr. Cindi Welsh.

## 2017-12-12 NOTE — ED PROVIDER NOTE - MEDICAL DECISION MAKING DETAILS
27 yo  @ 13 weeks gestation c/o worsening pelvic pain since last night and vaginal bleeding. pt seen in ED 3 times diagnosed with subchorionic hemorrhage. +IUP and FH yesterday. Worsening cramping. r/o threatened vs inevitable vs incomplete . Labs, sono, gyn consult.

## 2017-12-12 NOTE — ED ADULT TRIAGE NOTE - CHIEF COMPLAINT QUOTE
pt is about 13 weeks pregnant , pt c/o pelvic pain and vaginal bleeding , pt c/o nausea and vomiting , pt actively vomiting in triage

## 2017-12-15 DIAGNOSIS — O20.9 HEMORRHAGE IN EARLY PREGNANCY, UNSPECIFIED: ICD-10-CM

## 2017-12-15 DIAGNOSIS — Z3A.12 12 WEEKS GESTATION OF PREGNANCY: ICD-10-CM

## 2018-12-29 ENCOUNTER — EMERGENCY (EMERGENCY)
Facility: HOSPITAL | Age: 28
LOS: 1 days | Discharge: HOME | End: 2018-12-29
Attending: EMERGENCY MEDICINE | Admitting: EMERGENCY MEDICINE
Payer: COMMERCIAL

## 2018-12-29 VITALS
HEART RATE: 115 BPM | DIASTOLIC BLOOD PRESSURE: 68 MMHG | HEIGHT: 61.42 IN | OXYGEN SATURATION: 99 % | RESPIRATION RATE: 18 BRPM | SYSTOLIC BLOOD PRESSURE: 113 MMHG | WEIGHT: 114.64 LBS | TEMPERATURE: 103 F

## 2018-12-29 VITALS
OXYGEN SATURATION: 98 % | HEART RATE: 99 BPM | SYSTOLIC BLOOD PRESSURE: 101 MMHG | DIASTOLIC BLOOD PRESSURE: 60 MMHG | RESPIRATION RATE: 18 BRPM | TEMPERATURE: 99 F

## 2018-12-29 LAB
ALBUMIN SERPL ELPH-MCNC: 4.5 G/DL — SIGNIFICANT CHANGE UP (ref 3.3–5)
ALP SERPL-CCNC: 99 U/L — SIGNIFICANT CHANGE UP (ref 40–120)
ALT FLD-CCNC: 8 U/L — LOW (ref 10–45)
ANION GAP SERPL CALC-SCNC: 19 MMOL/L — HIGH (ref 5–17)
APPEARANCE UR: CLEAR — SIGNIFICANT CHANGE UP
AST SERPL-CCNC: 17 U/L — SIGNIFICANT CHANGE UP (ref 10–40)
BACTERIA # UR AUTO: PRESENT /HPF
BASOPHILS NFR BLD AUTO: 0.1 % — SIGNIFICANT CHANGE UP (ref 0–2)
BILIRUB SERPL-MCNC: 0.3 MG/DL — SIGNIFICANT CHANGE UP (ref 0.2–1.2)
BILIRUB UR-MCNC: NEGATIVE — SIGNIFICANT CHANGE UP
BLD GP AB SCN SERPL QL: NEGATIVE — SIGNIFICANT CHANGE UP
BUN SERPL-MCNC: 6 MG/DL — LOW (ref 7–23)
CALCIUM SERPL-MCNC: 9.3 MG/DL — SIGNIFICANT CHANGE UP (ref 8.4–10.5)
CHLORIDE SERPL-SCNC: 92 MMOL/L — LOW (ref 96–108)
CO2 SERPL-SCNC: 22 MMOL/L — SIGNIFICANT CHANGE UP (ref 22–31)
COLOR SPEC: YELLOW — SIGNIFICANT CHANGE UP
COMMENT - URINE: SIGNIFICANT CHANGE UP
CREAT SERPL-MCNC: 0.48 MG/DL — LOW (ref 0.5–1.3)
DIFF PNL FLD: ABNORMAL
EOSINOPHIL NFR BLD AUTO: 0.6 % — SIGNIFICANT CHANGE UP (ref 0–6)
EPI CELLS # UR: ABNORMAL /HPF (ref 0–5)
FLUAV H3 RNA SPEC QL NAA+PROBE: DETECTED
GLUCOSE SERPL-MCNC: 106 MG/DL — HIGH (ref 70–99)
GLUCOSE UR QL: NEGATIVE — SIGNIFICANT CHANGE UP
HCG SERPL-ACNC: 3921 MIU/ML — HIGH
HCT VFR BLD CALC: 36.6 % — SIGNIFICANT CHANGE UP (ref 34.5–45)
HGB BLD-MCNC: 12.2 G/DL — SIGNIFICANT CHANGE UP (ref 11.5–15.5)
KETONES UR-MCNC: ABNORMAL MG/DL
LACTATE SERPL-SCNC: 1.2 MMOL/L — SIGNIFICANT CHANGE UP (ref 0.5–2)
LEUKOCYTE ESTERASE UR-ACNC: NEGATIVE — SIGNIFICANT CHANGE UP
LIDOCAIN IGE QN: 23 U/L — SIGNIFICANT CHANGE UP (ref 7–60)
LYMPHOCYTES # BLD AUTO: 7.6 % — LOW (ref 13–44)
MCHC RBC-ENTMCNC: 30.3 PG — SIGNIFICANT CHANGE UP (ref 27–34)
MCHC RBC-ENTMCNC: 33.3 G/DL — SIGNIFICANT CHANGE UP (ref 32–36)
MCV RBC AUTO: 90.8 FL — SIGNIFICANT CHANGE UP (ref 80–100)
MONOCYTES NFR BLD AUTO: 13.2 % — SIGNIFICANT CHANGE UP (ref 2–14)
NEUTROPHILS NFR BLD AUTO: 78.5 % — HIGH (ref 43–77)
NITRITE UR-MCNC: NEGATIVE — SIGNIFICANT CHANGE UP
PH UR: 5 — SIGNIFICANT CHANGE UP (ref 5–8)
PLATELET # BLD AUTO: 173 K/UL — SIGNIFICANT CHANGE UP (ref 150–400)
POTASSIUM SERPL-MCNC: 3.7 MMOL/L — SIGNIFICANT CHANGE UP (ref 3.5–5.3)
POTASSIUM SERPL-SCNC: 3.7 MMOL/L — SIGNIFICANT CHANGE UP (ref 3.5–5.3)
PROT SERPL-MCNC: 8 G/DL — SIGNIFICANT CHANGE UP (ref 6–8.3)
PROT UR-MCNC: ABNORMAL MG/DL
RAPID RVP RESULT: DETECTED
RBC # BLD: 4.03 M/UL — SIGNIFICANT CHANGE UP (ref 3.8–5.2)
RBC # FLD: 12.2 % — SIGNIFICANT CHANGE UP (ref 10.3–16.9)
RBC CASTS # UR COMP ASSIST: < 5 /HPF — SIGNIFICANT CHANGE UP
RH IG SCN BLD-IMP: POSITIVE — SIGNIFICANT CHANGE UP
SODIUM SERPL-SCNC: 133 MMOL/L — LOW (ref 135–145)
SP GR SPEC: >=1.03 — SIGNIFICANT CHANGE UP (ref 1–1.03)
UROBILINOGEN FLD QL: 0.2 E.U./DL — SIGNIFICANT CHANGE UP
WBC # BLD: 9.5 K/UL — SIGNIFICANT CHANGE UP (ref 3.8–10.5)
WBC # FLD AUTO: 9.5 K/UL — SIGNIFICANT CHANGE UP (ref 3.8–10.5)
WBC UR QL: < 5 /HPF — SIGNIFICANT CHANGE UP

## 2018-12-29 PROCEDURE — 80053 COMPREHEN METABOLIC PANEL: CPT

## 2018-12-29 PROCEDURE — 87798 DETECT AGENT NOS DNA AMP: CPT

## 2018-12-29 PROCEDURE — 86850 RBC ANTIBODY SCREEN: CPT

## 2018-12-29 PROCEDURE — 96374 THER/PROPH/DIAG INJ IV PUSH: CPT

## 2018-12-29 PROCEDURE — 76817 TRANSVAGINAL US OBSTETRIC: CPT

## 2018-12-29 PROCEDURE — 87040 BLOOD CULTURE FOR BACTERIA: CPT

## 2018-12-29 PROCEDURE — 87633 RESP VIRUS 12-25 TARGETS: CPT

## 2018-12-29 PROCEDURE — 83605 ASSAY OF LACTIC ACID: CPT

## 2018-12-29 PROCEDURE — 36415 COLL VENOUS BLD VENIPUNCTURE: CPT

## 2018-12-29 PROCEDURE — 86901 BLOOD TYPING SEROLOGIC RH(D): CPT

## 2018-12-29 PROCEDURE — 76817 TRANSVAGINAL US OBSTETRIC: CPT | Mod: 26

## 2018-12-29 PROCEDURE — 99284 EMERGENCY DEPT VISIT MOD MDM: CPT

## 2018-12-29 PROCEDURE — 87581 M.PNEUMON DNA AMP PROBE: CPT

## 2018-12-29 PROCEDURE — 86900 BLOOD TYPING SEROLOGIC ABO: CPT

## 2018-12-29 PROCEDURE — 76801 OB US < 14 WKS SINGLE FETUS: CPT

## 2018-12-29 PROCEDURE — 84702 CHORIONIC GONADOTROPIN TEST: CPT

## 2018-12-29 PROCEDURE — 96361 HYDRATE IV INFUSION ADD-ON: CPT

## 2018-12-29 PROCEDURE — 87086 URINE CULTURE/COLONY COUNT: CPT

## 2018-12-29 PROCEDURE — 76815 OB US LIMITED FETUS(S): CPT | Mod: 26

## 2018-12-29 PROCEDURE — 76801 OB US < 14 WKS SINGLE FETUS: CPT | Mod: 26

## 2018-12-29 PROCEDURE — 87486 CHLMYD PNEUM DNA AMP PROBE: CPT

## 2018-12-29 PROCEDURE — 83690 ASSAY OF LIPASE: CPT

## 2018-12-29 PROCEDURE — 81001 URINALYSIS AUTO W/SCOPE: CPT

## 2018-12-29 PROCEDURE — 99284 EMERGENCY DEPT VISIT MOD MDM: CPT | Mod: 25

## 2018-12-29 PROCEDURE — 85025 COMPLETE CBC W/AUTO DIFF WBC: CPT

## 2018-12-29 RX ORDER — ONDANSETRON 8 MG/1
4 TABLET, FILM COATED ORAL ONCE
Qty: 0 | Refills: 0 | Status: COMPLETED | OUTPATIENT
Start: 2018-12-29 | End: 2018-12-29

## 2018-12-29 RX ORDER — SODIUM CHLORIDE 9 MG/ML
1000 INJECTION INTRAMUSCULAR; INTRAVENOUS; SUBCUTANEOUS ONCE
Qty: 0 | Refills: 0 | Status: COMPLETED | OUTPATIENT
Start: 2018-12-29 | End: 2018-12-29

## 2018-12-29 RX ORDER — ACETAMINOPHEN 500 MG
650 TABLET ORAL ONCE
Qty: 0 | Refills: 0 | Status: COMPLETED | OUTPATIENT
Start: 2018-12-29 | End: 2018-12-29

## 2018-12-29 RX ORDER — METOCLOPRAMIDE HCL 10 MG
1 TABLET ORAL
Qty: 12 | Refills: 0 | OUTPATIENT
Start: 2018-12-29 | End: 2018-12-31

## 2018-12-29 RX ADMIN — SODIUM CHLORIDE 1000 MILLILITER(S): 9 INJECTION INTRAMUSCULAR; INTRAVENOUS; SUBCUTANEOUS at 18:58

## 2018-12-29 RX ADMIN — Medication 75 MILLIGRAM(S): at 21:10

## 2018-12-29 RX ADMIN — Medication 650 MILLIGRAM(S): at 21:24

## 2018-12-29 RX ADMIN — Medication 650 MILLIGRAM(S): at 17:55

## 2018-12-29 RX ADMIN — SODIUM CHLORIDE 1000 MILLILITER(S): 9 INJECTION INTRAMUSCULAR; INTRAVENOUS; SUBCUTANEOUS at 17:58

## 2018-12-29 RX ADMIN — ONDANSETRON 4 MILLIGRAM(S): 8 TABLET, FILM COATED ORAL at 22:11

## 2018-12-29 RX ADMIN — SODIUM CHLORIDE 1000 MILLILITER(S): 9 INJECTION INTRAMUSCULAR; INTRAVENOUS; SUBCUTANEOUS at 17:57

## 2018-12-29 NOTE — ED ADULT TRIAGE NOTE - CHIEF COMPLAINT QUOTE
" I'm 6 weeks pregnant and have lite bleeding 2 weeks ago, and fever started yesterday, Took tylenol last 1 pm"

## 2018-12-29 NOTE — ED PROVIDER NOTE - OBJECTIVE STATEMENT
29 yo F LMP  11/15/18  SAB  1 year ago with fever to 102F  today  no headache or neck stiffness ? slight cough /congestion no sore throat  no skin rashes -  no leg pain or calf tenderness 27 yo F LMP  11/15/18  SAB  1 year ago with fever to 102F  today  no headache or neck stiffness ? slight cough /congestion no sore throat  no skin rashes -  no leg pain or calf tenderness-scant vag bleeding noted in the past 1-2 days   no flank pain or dysuria or freq

## 2018-12-29 NOTE — ED ADULT NURSE NOTE - NSIMPLEMENTINTERV_GEN_ALL_ED
Implemented All Universal Safety Interventions:  [place]uck to call system. Call bell, personal items and telephone within reach. Instruct patient to call for assistance. Room bathroom lighting operational. Non-slip footwear when patient is off stretcher. Physically safe environment: no spills, clutter or unnecessary equipment. Stretcher in lowest position, wheels locked, appropriate side rails in place.

## 2018-12-29 NOTE — ED ADULT NURSE NOTE - OBJECTIVE STATEMENT
Pt a&ox3 walked in from triage c/o flu like symptoms. Pt is 6w pregnant LMP, Nov 15. Pt complains of light bleeding that started today. Pt denies abdominal pain, pain on urination, burning on urination. No pain upon palpation. Clear BS, bilaterally. Febrile 101.5F, chills, muscle aches. Non productive cough for the past couple of days. Mask applied. Took tylenol at 1300 today. Hx. Miscarriage 1 year ago.

## 2018-12-29 NOTE — CONSULT NOTE ADULT - ASSESSMENT
Assessment and Plan:   27 yo  at 6w2d presents for evaluation of vaginal bleeding and flu-like symptoms.   - RVP positive for Flu - recommend initiating course of Tamiflu, Encourage PO hydration, Tylenol PRN   - Patient initially febrile but temperature improved with Tylenol. Vitals otherwise stable.  - No white count,   - Benign pelvic exam. US demonstrates likely intrauterine pregnancy but cannot rule out failed pregnancy versus pregnancy of unknown location.   - Patient to return in 48 hours for repeat beta HCG and sonogram.   - Strict return precautions given: worsening bleeding, severe pain, fevers that do not respond to Tylenol       Patient discussed with Krysten Krishnamurthy MD - attending.

## 2018-12-29 NOTE — ED PROVIDER NOTE - MEDICAL DECISION MAKING DETAILS
29 yo F  4 weeks pregnant with vag bleeding pos flu will rx with tamiflu awaiting full GYN eval  low susp for septic  likely flu

## 2018-12-29 NOTE — CONSULT NOTE ADULT - SUBJECTIVE AND OBJECTIVE BOX
29 yo  at 5w6d presents for evaluation of vaginal spotting. Patient reports the onset of vaginal bleeding earlier this afternoon. Patient also reports cold like symptoms including fevers and chills, cough, and congestion. She denies abdominal pain, abnormal discharge, nausea and vomiting.     OBHx: G1- SAB; G2: Current; regular OB care with outside OBGYN   GYN Hx: Denies   PMHx: Denies   SHx:  Meds: Folic Acid  Allergies: NKDA     PHYSICAL EXAM:   Vital Signs Last 24 Hrs  T(C): 37.7 (29 Dec 2018 18:03), Max: 39.3 (29 Dec 2018 17:18)  T(F): 99.8 (29 Dec 2018 18:03), Max: 102.8 (29 Dec 2018 17:18)  HR: 105 (29 Dec 2018 18:03) (105 - 115)  BP: 101/63 (29 Dec 2018 18:03) (101/63 - 113/68)  BP(mean): --  RR: 18 (29 Dec 2018 18:03) (18 - 18)  SpO2: 99% (29 Dec 2018 18:03) (99% - 99%)    **************************  Constitutional: Alert & Oriented x3, No acute distress  Respiratory: Clear to ausculation bilaterally; no wheezing, rhonchi, or crackles  Cardiovascular: regular rate and rhythm, no murmurs, or gallops  Gastrointestinal: soft, non tender, positive bowel sounds, no rebound or guarding   Pelvic exam:   Extremities: no calf tenderness or swelling      LABS:                        12.2   9.5   )-----------( 173      ( 29 Dec 2018 17:51 )             36.6         133<L>  |  92<L>  |  6<L>  ----------------------------<  106<H>  3.7   |  22  |  0.48<L>    Ca    9.3      29 Dec 2018 17:51    TPro  8.0  /  Alb  4.5  /  TBili  0.3  /  DBili  x   /  AST  17  /  ALT  8<L>  /  AlkPhos  99        Urinalysis Basic - ( 29 Dec 2018 18:14 )    Color: Yellow / Appearance: Clear / SG: >=1.030 / pH: x  Gluc: x / Ketone: Trace mg/dL  / Bili: Negative / Urobili: 0.2 E.U./dL   Blood: x / Protein: Trace mg/dL / Nitrite: NEGATIVE   Leuk Esterase: NEGATIVE / RBC: < 5 /HPF / WBC < 5 /HPF   Sq Epi: x / Non Sq Epi: 5-10 /HPF / Bacteria: Present /HPF      HCG Quantitative, Serum: 3921.0 mIU/mL (12- @ 17:51)      RADIOLOGY & ADDITIONAL STUDIES: 27 yo  at 6w2d by LMP presents for evaluation of vaginal spotting. Patient reports the onset of vaginal bleeding earlier this afternoon. She denies recent intercourse. Patient also reports cold like symptoms including fevers and chills, cough, back pain and congestion which started 24 hours ago. She denies abdominal pain, abnormal discharge, vomiting, chest pain and shortness of breath.     OBHx: G1- SAB; G2: Current; regular OB care with outside OBGYN   GYN Hx: Denies   PMHx: Denies   SHx:  Meds: Folic Acid  Allergies: NKDA     PHYSICAL EXAM:   Vital Signs Last 24 Hrs  T(C): 37.7 (29 Dec 2018 18:03), Max: 39.3 (29 Dec 2018 17:18)  T(F): 99.8 (29 Dec 2018 18:03), Max: 102.8 (29 Dec 2018 17:18)  HR: 105 (29 Dec 2018 18:03) (105 - 115)  BP: 101/63 (29 Dec 2018 18:03) (101/63 - 113/68)  BP(mean): --  RR: 18 (29 Dec 2018 18:03) (18 - 18)  SpO2: 99% (29 Dec 2018 18:03) (99% - 99%)    **************************  Constitutional: Alert & Oriented x3, No acute distress  Respiratory: Clear to ausculation bilaterally; no wheezing, rhonchi, or crackles  Cardiovascular: regular rate and rhythm, no murmurs, or gallops  Gastrointestinal: soft, non tender, positive bowel sounds, no rebound or guarding   Extremities: no calf tenderness or swelling      LABS:                        12.2   9.5   )-----------( 173      ( 29 Dec 2018 17:51 )             36.6         133<L>  |  92<L>  |  6<L>  ----------------------------<  106<H>  3.7   |  22  |  0.48<L>    Ca    9.3      29 Dec 2018 17:51    TPro  8.0  /  Alb  4.5  /  TBili  0.3  /  DBili  x   /  AST  17  /  ALT  8<L>  /  AlkPhos  99        Urinalysis Basic - ( 29 Dec 2018 18:14 )    Color: Yellow / Appearance: Clear / SG: >=1.030 / pH: x  Gluc: x / Ketone: Trace mg/dL  / Bili: Negative / Urobili: 0.2 E.U./dL   Blood: x / Protein: Trace mg/dL / Nitrite: NEGATIVE   Leuk Esterase: NEGATIVE / RBC: < 5 /HPF / WBC < 5 /HPF   Sq Epi: x / Non Sq Epi: 5-10 /HPF / Bacteria: Present /HPF      HCG Quantitative, Serum: 3921.0 mIU/mL ( @ 17:51)      RADIOLOGY & ADDITIONAL STUDIES:  < from: US Echo Transvaginal, OB (18 @ 19:49) >  EXAM:  US OB TRANSVAGINAL                          EXAM:  US OB LES THAN 14 WKS 1ST GEST                          PROCEDURE DATE:  2018          INTERPRETATION:  Resident preliminary report.    PELVIC ULTRASOUND dated 2018 7:27 PM    INDICATION: First trimester pregnancy with vaginal bleeding LMP:   11/15/2018    TECHNIQUE: Transabdominal views of the pelvis were obtained followed by   transvaginal views for better visualization of the ovaries.      PRIOR STUDIES: None    FINDINGS:   By dates, the estimated gestational age is 6 weeks 2 days.      These images demonstrate the uterus to be anteverted. The uterus is   normal in size and shape.  The uterus is 8.4 x 4.2 x 7.8 cm.  No   myometrial abnormalities are seen.  The endometrium is thick, measuring   1.6 cm in thickness. There is an eccentric cystic structure within the   endometrial canal. If this represents a gestational sac it correlates to   an average gestational age of 4 weeks 5 days. No yolk sac or embryonic   poleis identified at this time.    The right ovary is enlarged, measuring 4.3 x 3.4 x 2.9 cm. There is a 2.0   cm right corpus luteum in the right ovary. The left ovary is normal in   size, measuring 3.5 x 1.9 x 1.5 cm. No left ovarian masses are seen.   Doppler evaluation demonstrates flow to both ovaries with no evidence of   torsion.    A small amount of free fluid is seen in the cul-de-sac.      IMPRESSION:   Small cystic structure within the uterus, if this does represent a   gestational sac it correlates to a gestational age of 4 weeks 5 days. No   yolk sac or fetal pole within this structure. Differentials include early   pregnancy, failed pregnancy or evolving ectopic pregnancy. Recommend   correlation with the patient's present beta hCG levels as it was not   available at the time of this dictation.     "Thank you for the opportunity to participate in the care of this   patient."    MARINO KAUR M.D., RADIOLOGY RESIDENT  This document has been electronically signed.  ANNIE GALLAGHER M.D., ATTENDING RADIOLOGIST  This document has been electronically signed. Dec 29 2018  9:23PM 29 yo  at 6w2d by LMP presents for evaluation of vaginal spotting. Patient reports the onset of vaginal bleeding earlier this afternoon. She denies recent intercourse. Patient also reports cold like symptoms including fevers and chills, cough, back pain and congestion which started 24 hours ago. She denies abdominal pain, abnormal discharge, vomiting, chest pain and shortness of breath.     OBHx: G1- SAB; G2: Current; regular OB care with outside OBGYN   GYN Hx: Denies   PMHx: Denies   SHx: Denies   Meds: Folic Acid  Allergies: NKDA     PHYSICAL EXAM:   Vital Signs Last 24 Hrs  T(C): 37.7 (29 Dec 2018 18:03), Max: 39.3 (29 Dec 2018 17:18)  T(F): 99.8 (29 Dec 2018 18:03), Max: 102.8 (29 Dec 2018 17:18)  HR: 105 (29 Dec 2018 18:03) (105 - 115)  BP: 101/63 (29 Dec 2018 18:03) (101/63 - 113/68)  BP(mean): --  RR: 18 (29 Dec 2018 18:03) (18 - 18)  SpO2: 99% (29 Dec 2018 18:03) (99% - 99%)    **************************  Constitutional: Alert & Oriented x3, No acute distress  Gastrointestinal: soft, midlly tender, positive bowel sounds, no rebound or guarding   Pelvic: normal appearing external female genitalia, normal vagina, less than 5 cc of dark brown blood in vault, no active bleeding, no CMT, no adnexal fullness   Extremities: no calf tenderness or swelling      LABS:                        12.2   9.5   )-----------( 173      ( 29 Dec 2018 17:51 )             36.6         133<L>  |  92<L>  |  6<L>  ----------------------------<  106<H>  3.7   |  22  |  0.48<L>    Ca    9.3      29 Dec 2018 17:51    TPro  8.0  /  Alb  4.5  /  TBili  0.3  /  DBili  x   /  AST  17  /  ALT  8<L>  /  AlkPhos  99      RVP positive for Flu     Urinalysis Basic - ( 29 Dec 2018 18:14 )    Color: Yellow / Appearance: Clear / SG: >=1.030 / pH: x  Gluc: x / Ketone: Trace mg/dL  / Bili: Negative / Urobili: 0.2 E.U./dL   Blood: x / Protein: Trace mg/dL / Nitrite: NEGATIVE   Leuk Esterase: NEGATIVE / RBC: < 5 /HPF / WBC < 5 /HPF   Sq Epi: x / Non Sq Epi: 5-10 /HPF / Bacteria: Present /HPF      HCG Quantitative, Serum: 3921.0 mIU/mL ( @ 17:51)      RADIOLOGY & ADDITIONAL STUDIES:  < from: US Echo Transvaginal, OB (18 @ 19:49) >  EXAM:  US OB TRANSVAGINAL                          EXAM:  US OB LES THAN 14 WKS 1ST GEST                          PROCEDURE DATE:  2018          INTERPRETATION:  Resident preliminary report.    PELVIC ULTRASOUND dated 2018 7:27 PM    INDICATION: First trimester pregnancy with vaginal bleeding LMP:   11/15/2018    TECHNIQUE: Transabdominal views of the pelvis were obtained followed by   transvaginal views for better visualization of the ovaries.      PRIOR STUDIES: None    FINDINGS:   By dates, the estimated gestational age is 6 weeks 2 days.      These images demonstrate the uterus to be anteverted. The uterus is   normal in size and shape.  The uterus is 8.4 x 4.2 x 7.8 cm.  No   myometrial abnormalities are seen.  The endometrium is thick, measuring   1.6 cm in thickness. There is an eccentric cystic structure within the   endometrial canal. If this represents a gestational sac it correlates to   an average gestational age of 4 weeks 5 days. No yolk sac or embryonic   poleis identified at this time.    The right ovary is enlarged, measuring 4.3 x 3.4 x 2.9 cm. There is a 2.0   cm right corpus luteum in the right ovary. The left ovary is normal in   size, measuring 3.5 x 1.9 x 1.5 cm. No left ovarian masses are seen.   Doppler evaluation demonstrates flow to both ovaries with no evidence of   torsion.    A small amount of free fluid is seen in the cul-de-sac.      IMPRESSION:   Small cystic structure within the uterus, if this does represent a   gestational sac it correlates to a gestational age of 4 weeks 5 days. No   yolk sac or fetal pole within this structure. Differentials include early   pregnancy, failed pregnancy or evolving ectopic pregnancy. Recommend   correlation with the patient's present beta hCG levels as it was not   available at the time of this dictation.     "Thank you for the opportunity to participate in the care of this   patient."    MARINO KAUR M.D., RADIOLOGY RESIDENT  This document has been electronically signed.  ANNIE GALLAGHER M.D., ATTENDING RADIOLOGIST  This document has been electronically signed. Dec 29 2018  9:23PM

## 2018-12-31 ENCOUNTER — EMERGENCY (EMERGENCY)
Facility: HOSPITAL | Age: 28
LOS: 1 days | Discharge: HOME | End: 2018-12-31
Admitting: EMERGENCY MEDICINE
Payer: COMMERCIAL

## 2018-12-31 VITALS
RESPIRATION RATE: 18 BRPM | HEART RATE: 103 BPM | SYSTOLIC BLOOD PRESSURE: 113 MMHG | OXYGEN SATURATION: 100 % | WEIGHT: 114.64 LBS | HEIGHT: 61.42 IN | DIASTOLIC BLOOD PRESSURE: 66 MMHG | TEMPERATURE: 98 F

## 2018-12-31 VITALS
DIASTOLIC BLOOD PRESSURE: 77 MMHG | OXYGEN SATURATION: 99 % | SYSTOLIC BLOOD PRESSURE: 115 MMHG | HEART RATE: 90 BPM | RESPIRATION RATE: 16 BRPM | TEMPERATURE: 98 F

## 2018-12-31 DIAGNOSIS — Z79.2 LONG TERM (CURRENT) USE OF ANTIBIOTICS: ICD-10-CM

## 2018-12-31 DIAGNOSIS — Z34.91 ENCOUNTER FOR SUPERVISION OF NORMAL PREGNANCY, UNSPECIFIED, FIRST TRIMESTER: ICD-10-CM

## 2018-12-31 DIAGNOSIS — Z3A.01 LESS THAN 8 WEEKS GESTATION OF PREGNANCY: ICD-10-CM

## 2018-12-31 DIAGNOSIS — Z79.899 OTHER LONG TERM (CURRENT) DRUG THERAPY: ICD-10-CM

## 2018-12-31 LAB
BASOPHILS NFR BLD AUTO: 0.2 % — SIGNIFICANT CHANGE UP (ref 0–2)
CULTURE RESULTS: SIGNIFICANT CHANGE UP
EOSINOPHIL NFR BLD AUTO: 0.7 % — SIGNIFICANT CHANGE UP (ref 0–6)
HCG SERPL-ACNC: 4298 MIU/ML — HIGH
HCT VFR BLD CALC: 35.4 % — SIGNIFICANT CHANGE UP (ref 34.5–45)
HGB BLD-MCNC: 11.9 G/DL — SIGNIFICANT CHANGE UP (ref 11.5–15.5)
LYMPHOCYTES # BLD AUTO: 31.9 % — SIGNIFICANT CHANGE UP (ref 13–44)
MCHC RBC-ENTMCNC: 30.3 PG — SIGNIFICANT CHANGE UP (ref 27–34)
MCHC RBC-ENTMCNC: 33.6 G/DL — SIGNIFICANT CHANGE UP (ref 32–36)
MCV RBC AUTO: 90.1 FL — SIGNIFICANT CHANGE UP (ref 80–100)
MONOCYTES NFR BLD AUTO: 17.3 % — HIGH (ref 2–14)
NEUTROPHILS NFR BLD AUTO: 49.9 % — SIGNIFICANT CHANGE UP (ref 43–77)
PLATELET # BLD AUTO: 154 K/UL — SIGNIFICANT CHANGE UP (ref 150–400)
RBC # BLD: 3.93 M/UL — SIGNIFICANT CHANGE UP (ref 3.8–5.2)
RBC # FLD: 12.7 % — SIGNIFICANT CHANGE UP (ref 10.3–16.9)
SPECIMEN SOURCE: SIGNIFICANT CHANGE UP
WBC # BLD: 5.4 K/UL — SIGNIFICANT CHANGE UP (ref 3.8–10.5)
WBC # FLD AUTO: 5.4 K/UL — SIGNIFICANT CHANGE UP (ref 3.8–10.5)

## 2018-12-31 PROCEDURE — 84702 CHORIONIC GONADOTROPIN TEST: CPT

## 2018-12-31 PROCEDURE — 85025 COMPLETE CBC W/AUTO DIFF WBC: CPT

## 2018-12-31 PROCEDURE — 76815 OB US LIMITED FETUS(S): CPT

## 2018-12-31 PROCEDURE — 99284 EMERGENCY DEPT VISIT MOD MDM: CPT

## 2018-12-31 PROCEDURE — 76817 TRANSVAGINAL US OBSTETRIC: CPT

## 2018-12-31 PROCEDURE — 76817 TRANSVAGINAL US OBSTETRIC: CPT | Mod: 26

## 2018-12-31 PROCEDURE — 76815 OB US LIMITED FETUS(S): CPT | Mod: 26

## 2018-12-31 PROCEDURE — 76802 OB US < 14 WKS ADDL FETUS: CPT

## 2018-12-31 NOTE — ED ADULT NURSE NOTE - OBJECTIVE STATEMENT
Pt states she was told to come back to ER for repeat blood work since she was told "it is to early".  #[id number] used to speak to patient.

## 2018-12-31 NOTE — CONSULT NOTE ADULT - SUBJECTIVE AND OBJECTIVE BOX
Pt seen and examined with PGY2 Dr. Chauhan. Setswana  used- ID#[id number]  27 yo  at 6w4d by LMP presents for repeat bHCG and sonogram. Pt was here 2 days ago and sono was done which revealed pregnancy of unknown location- showed cystic structure in uterus with no yolk sac or fetal pole. Pt states she has vaginal bleeding two days ago but none today. Pt states she has a flight to catch to [place] today.  She denies abdominal pain, abnormal discharge, vomiting, chest pain and shortness of breath, lightheadedness or dizziness.      OBHx: G1- SAB; G2: Current; regular OB care with outside OBGYN   GYN Hx: Denies   PMHx: Denies   SHx: Denies   Meds: Folic Acid  Allergies: NKDA     PHYSICAL EXAM:   Vital Signs Last 24 Hrs  T(C): 36.9 (31 Dec 2018 09:35), Max: 36.9 (31 Dec 2018 09:35)  T(F): 98.4 (31 Dec 2018 09:35), Max: 98.4 (31 Dec 2018 09:35)  HR: 103 (31 Dec 2018 09:35) (103 - 103)  BP: 113/66 (31 Dec 2018 09:35) (113/66 - 113/66)  BP(mean): --  RR: 18 (31 Dec 2018 09:35) (18 - 18)  SpO2: 100% (31 Dec 2018 09:35) (100% - 100%)    **************************  Constitutional: No acute distress  Respiratory: Clear to ausculation bilaterally  Cardiovascular: regular rate and rhythm  Gastrointestinal: soft, nontender, positive bowel sounds, no rebound or guarding   Pelvic exam: normal external genitalia, no CMT or adnexal tenderness, normal physiologic vaginal discharge   Extremities: no calf tenderness or swelling    LABS:                        11.9   5.4   )-----------( 154      ( 31 Dec 2018 10:20 )             35.4     12-    133<L>  |  92<L>  |  6<L>  ----------------------------<  106<H>  3.7   |  22  |  0.48<L>    Ca    9.3      29 Dec 2018 17:51    TPro  8.0  /  Alb  4.5  /  TBili  0.3  /  DBili  x   /  AST  17  /  ALT  8<L>  /  AlkPhos  99        Urinalysis Basic - ( 29 Dec 2018 18:14 )    Color: Yellow / Appearance: Clear / SG: >=1.030 / pH: x  Gluc: x / Ketone: Trace mg/dL  / Bili: Negative / Urobili: 0.2 E.U./dL   Blood: x / Protein: Trace mg/dL / Nitrite: NEGATIVE   Leuk Esterase: NEGATIVE / RBC: < 5 /HPF / WBC < 5 /HPF   Sq Epi: x / Non Sq Epi: 5-10 /HPF / Bacteria: Present /HPF    HCG Quantitative, Serum: 4298.0 mIU/mL ( @ 10:20)    RADIOLOGY & ADDITIONAL STUDIES:    EXAM:  US OB LES THAN 14WK EA ADD GES                          EXAM:  US OB TRANSVAGINAL                          PROCEDURE DATE:  2018        INTERPRETATION:  OBSTETRICAL ULTRASOUND - FIRST TRIMESTER dated   2018 12:18 PM    INDICATION: First trimester pregnancy with vaginal bleeding. Influenza.   LMP: 11/15/2018.  Quantitative serum beta hCG level on 2018: 3921.   Beta-hCG level today: 4298.    TECHNIQUE: Transabdominal views of the pelvis were obtained followed by   transvaginal views for better visualization of the endometrial cavity and   ovaries.      PRIOR STUDIES: None    FINDINGS:   By dates, the estimated gestational age is 6 weeks 4 days.    A single intrauterine saclike structure is visible with a mean sac   diameter 0.4 cm which would correspond to a gestational age of 4 weeks   and 6 days. Arcuate shape uterus is noted. The gestational sac is on the   right side of the uterine fundus endometrium.  No yolk sac or fetal pole   is identified at this time.     No subchorionic bleed is visible.  The cervix is closed with a length of   3.5 cm.    The uterus is anteverted. The uterus is 8.7 x 4.4 x 8.0 cm.  No   myometrial abnormalities are seen.     The right ovary is mildly enlarged, measuring 4.3 x 2.9 x 3.2 cm   secondary to the presence of a 2.6 x 1.9 x 2.2 cm complex cystic lesion,   likely a complex corpus luteum cyst . The cyst contains an internal thick   septation. The left ovary is normal in size, measuring 3.5 x 1.3 x 1.5   cm. No left ovarian masses are seen. Doppler evaluation demonstrates flow   to both ovaries with no evidence of torsion. Small free fluid in the   cul-de-sac is seen.    IMPRESSION:   0.4 cm intrauterine saclike structure, which previously measured 0.3 cm   likely representing an early intrauterine gestation. No fetal pole or   yolk sac is identified at this time. It does not have the appearance of a   pseudogestational sac of ectopic gestation. However, for confirmation   recommend follow-up ultrasound in 7 days, and serial beta-hCG levels   every 2 days in order to establish fetal viability.        "Thank you for the opportunity to participate in the care of this   patient."    JAYLIN COLINDRES M.D., ATTENDING RADIOLOGIST  This document has been electronically signed. Dec 31 2018 12:48PM

## 2018-12-31 NOTE — ED ADULT NURSE NOTE - NSIMPLEMENTINTERV_GEN_ALL_ED
Implemented All Universal Safety Interventions:  [place] to call system. Call bell, personal items and telephone within reach. Instruct patient to call for assistance. Room bathroom lighting operational. Non-slip footwear when patient is off stretcher. Physically safe environment: no spills, clutter or unnecessary equipment. Stretcher in lowest position, wheels locked, appropriate side rails in place.

## 2018-12-31 NOTE — ED ADULT NURSE NOTE - CHPI ED NUR SYMPTOMS NEG
no nausea/no pain/no abdominal pain/no discharge/no vaginal discharge/no vomiting/no coffee grounds emesis/no fever/no back pain

## 2018-12-31 NOTE — CONSULT NOTE ADULT - ATTENDING COMMENTS
Given inappropriate rise reviewed risk of ectopic vs failed pregnancy. Patient agrees for complete follow up and will defer overseas travel at this time.   Will follow closely with resident team. Bleeding and pain precautions reviewed in patient's native language. Will return in 2 days.

## 2018-12-31 NOTE — ED PROVIDER NOTE - MEDICAL DECISION MAKING DETAILS
pregnancy of unknown location. no pelvic pain or bleeding. VSS.  a febrile. pt on tamiflu and dx with influenza a 2 days ago. labs noted and u/s done. Pt evaluated by GYN and recommending f/u in 2 days for repeat testing. pt reports may fly home to [place] tonight. GYN recommending staying in [place] until repeat testing done in 2 days. pt understands risks. f/u in 2 days for repeat testing. ectopic precautions d/w pt.

## 2018-12-31 NOTE — CONSULT NOTE ADULT - ASSESSMENT
27 yo  at 6w4d by LMP with pregnancy of unknown location. Pt has inappropriately rising bHCG from 2 days: 3921 to 4298. US reveals 0.4cm intrauterine saclike structure without a fetal pole of yolk sac. No ectopic pregnancy identified. Pt understands this pregnancy can either be a failed pregnancy, early pregnancy or an ectopic pregnancy. Pt advised that an ectopic pregnancy is life-threatening and she will need to follow up with GYN here at [facility] for another repeat bHCG and TVUS on 18. Pt understands that getting on a plane right now would be risky because if pregnancy ends up being an ectopic pregnancy, there is a possibility it may rupture and she can hemorrhage and ultimately die. Pt verbally states she will not travel out of the [place] then and will come back in 2 days. Pt to go to nearest ED if fever >100.4, severe abdominal pain or heavy vaginal bleeding. Pt discussed with Dr. Krishnamurthy.

## 2018-12-31 NOTE — ED PROVIDER NOTE - OBJECTIVE STATEMENT
27 y/o female 6 wks pregnant by dates c/o follow up hcg. pt feeling well. pt states no fever for past 24 hrs and taking tamiflu. no cp, sob, or cough. no abd apin, n/v/d. no vag d/c or bleeding. no pelvic pain. last u/s 2 days ago did not show an IUP,.  [id number]

## 2019-01-01 DIAGNOSIS — J09.X2 INFLUENZA DUE TO IDENTIFIED NOVEL INFLUENZA A VIRUS WITH OTHER RESPIRATORY MANIFESTATIONS: ICD-10-CM

## 2019-01-01 DIAGNOSIS — O99.511 DISEASES OF THE RESPIRATORY SYSTEM COMPLICATING PREGNANCY, FIRST TRIMESTER: ICD-10-CM

## 2019-01-01 DIAGNOSIS — Z79.2 LONG TERM (CURRENT) USE OF ANTIBIOTICS: ICD-10-CM

## 2019-01-01 DIAGNOSIS — Z3A.01 LESS THAN 8 WEEKS GESTATION OF PREGNANCY: ICD-10-CM

## 2019-01-01 DIAGNOSIS — O99.89 OTHER SPECIFIED DISEASES AND CONDITIONS COMPLICATING PREGNANCY, CHILDBIRTH AND THE PUERPERIUM: ICD-10-CM

## 2019-01-02 ENCOUNTER — EMERGENCY (EMERGENCY)
Facility: HOSPITAL | Age: 29
LOS: 1 days | Discharge: HOME | End: 2019-01-02
Attending: EMERGENCY MEDICINE | Admitting: EMERGENCY MEDICINE
Payer: COMMERCIAL

## 2019-01-02 VITALS
TEMPERATURE: 98 F | RESPIRATION RATE: 16 BRPM | OXYGEN SATURATION: 100 % | SYSTOLIC BLOOD PRESSURE: 111 MMHG | HEART RATE: 91 BPM | DIASTOLIC BLOOD PRESSURE: 67 MMHG

## 2019-01-02 DIAGNOSIS — Z34.81 ENCOUNTER FOR SUPERVISION OF OTHER NORMAL PREGNANCY, FIRST TRIMESTER: ICD-10-CM

## 2019-01-02 DIAGNOSIS — Z79.899 OTHER LONG TERM (CURRENT) DRUG THERAPY: ICD-10-CM

## 2019-01-02 DIAGNOSIS — Z79.2 LONG TERM (CURRENT) USE OF ANTIBIOTICS: ICD-10-CM

## 2019-01-02 DIAGNOSIS — Z3A.01 LESS THAN 8 WEEKS GESTATION OF PREGNANCY: ICD-10-CM

## 2019-01-02 LAB — HCG SERPL-ACNC: 5621 MIU/ML — HIGH

## 2019-01-02 PROCEDURE — 76801 OB US < 14 WKS SINGLE FETUS: CPT | Mod: 26

## 2019-01-02 PROCEDURE — 99284 EMERGENCY DEPT VISIT MOD MDM: CPT

## 2019-01-02 PROCEDURE — 84702 CHORIONIC GONADOTROPIN TEST: CPT

## 2019-01-02 PROCEDURE — 76817 TRANSVAGINAL US OBSTETRIC: CPT

## 2019-01-02 PROCEDURE — 36415 COLL VENOUS BLD VENIPUNCTURE: CPT

## 2019-01-02 PROCEDURE — 99284 EMERGENCY DEPT VISIT MOD MDM: CPT | Mod: 25

## 2019-01-02 PROCEDURE — 76815 OB US LIMITED FETUS(S): CPT | Mod: 26

## 2019-01-02 PROCEDURE — 76801 OB US < 14 WKS SINGLE FETUS: CPT

## 2019-01-02 PROCEDURE — 76817 TRANSVAGINAL US OBSTETRIC: CPT | Mod: 26

## 2019-01-02 NOTE — ED PROVIDER NOTE - OBJECTIVE STATEMENT
History via [facility]  [id number] History via Kyr[place]z  [id number]    29 y/o f  presents for 48 hour follow up for +pregnancy test with no IUP identified yet.  Pt was in ED 2 days ago, hcg increased from 3900 to 4200, ultrasound showing possible gestational sac, no yolk sac and pt recommended to f/u today for repeat hcg and u/s.  Pt stating she feels well, has no complaints, no pain or bleeding currently.

## 2019-01-02 NOTE — CONSULT NOTE ADULT - SUBJECTIVE AND OBJECTIVE BOX
Pt seen and examined. Danish  used  29 yo  at 6w4d by LMP presents for repeat bHCG and sonogram. Pt was here multiple times with inappropriate increase in bHCG twice. Today still has an inappropriate increase in bHCG. Sono prior revealed pregnancy of unknown location but now states gestation sac of 8mm with no yolk sack. Pt states she has no vaginal bleeding. She denies abdominal pain, abnormal discharge, vomiting, chest pain and shortness of breath, lightheadedness or dizziness.      OBHx: G1- SAB; G2: Current; regular OB care with outside OBGYN   GYN Hx: Denies   PMHx: Denies   SHx: Denies   Meds: Folic Acid  Allergies: NKDA     PHYSICAL EXAM:   Vital Signs Last 24 Hrs  T(C): 36.9 (2019 09:21), Max: 36.9 (2019 09:21)  T(F): 98.4 (2019 09:21), Max: 98.4 (2019 09:21)  HR: 91 (2019 09:21) (91 - 91)  BP: 111/67 (2019 09:21) (111/67 - 111/67)  BP(mean): --  RR: 16 (2019 09:21) (16 - 16)  SpO2: 100% (2019 09:21) (100% - 100%)    **************************  Constitutional: No acute distress  Respiratory: Clear to ausculation bilaterally  Cardiovascular: regular rate and rhythm  Gastrointestinal: soft, nontender, positive bowel sounds, no rebound or guarding   Pelvic exam: normal external genitalia, no CMT or adnexal tenderness, normal physiologic vaginal discharge   Extremities: no calf tenderness or swelling    LABS:       HCG Quantitative, Serum (19 @ 10:23)    HCG Quantitative, Serum: 5621.0      < from: US Echo Transvaginal, OB (19 @ 11:29) >    EXAM:  US OB TRANSVAGINAL                          EXAM:  US OB LES THAN 14 WKS 1ST GEST                          PROCEDURE DATE:  2019          INTERPRETATION:  OBSTETRICAL ULTRASOUND - FIRST TRIMESTER dated 2019   11:11 AM    INDICATION: 27yo G2PO F w concern for ectopic vs IUP pregnancy presenting   for repeat US. Beta-hCG is 5628 (18 Beta-hCG 4298)  LMP: 11/15/2018    TECHNIQUE: Transabdominal views of the pelvis were obtained followed by   transvaginal views for better visualization of the endometrial cavity.      PRIOR STUDIES: First semester OB ultrasound 2018    FINDINGS:   By dates, the estimated gestational age is 6 weeks 6 days.    A single intrauterine gestation is visible with an average ultrasound age   of 5 weeks 3 days, situated at the right cornua of the uterus.   Mean sac diameter is 0.81 cm, corresponding to a gestational age of 6   weeks 6 days.  No fetal pole or yolk sac identified at this time.    No subchorionic bleed is visible. The cervix is closed with a length of   3.1 cm.    The uterus is anteverted, and demonstrates an arcuate morphologic shape.   The uterus is 7.7 x 3.3 x 6.6 cm. No focal myometrial abnormalities are   seen.     The right ovary measures up to 4.8 x 2.8 x 3.0 cm, and again contains a   complex cystic lesion measuring 0.8 x 1.9 x 2.0 cm, probably representing   a dominant follicle or corpus luteum cyst. No other complex masses in the   right adnexa. The left ovary is normal in size, measuring 2.8 x 1.5 x 1.3   cm. No left ovarian masses are seen. Doppler evaluation demonstrates flow   to both ovaries with no evidence of torsion.    Small amount of free fluid in the cul-de-sac.       IMPRESSION:   Interval growth of the current 0.8 cm intrauterine gestational sac   (previously measured 0.4 cm) representing an early intrauterine   pregnancy. No fetal pole or yolk sac identified at this time.  Continued   follow-up is recommended.                "Thank you for the opportunity to participate in the care of this   patient."    MICHELE MORAN M.D., RADIOLOGY RESIDENT  This document has been electronically signed.  NORMA MEDEROS M.D., ATTENDING RADIOLOGIST  This document has been electronically signed. 2019 12:51PM                  < end of copied text >

## 2019-01-02 NOTE — ED PROVIDER NOTE - ATTENDING CONTRIBUTION TO CARE
27 yo female  seen  and  w ?ectopic vs early iup and uptrending hcg returns today for repeat hcg and imaging.  Prior u/s  w saclike structure w/o fetal pole or yolk sac but no other indicators of ectopic.  Pt initially w vag bleeding but none now, no pelvic pain.  History via [employer]  [id number]. Well appearing, nad, nc/at, lung cta, heart reg, abd soft, nt, ext no gross deformity, no gross neuro deficits  ? early iup vs ectopic.  Plan hcg, us, gyn consult.  Reassess.

## 2019-01-02 NOTE — ED PROVIDER NOTE - PROGRESS NOTE DETAILS
GYN evaluated pt, hcg rising and gestational sac enlarging consistent with early IUP, pt with no pain, stable for d/c, to f/u in 1 week either in [place] or at [facility] if stays in [facility]

## 2019-01-02 NOTE — ED ADULT NURSE NOTE - OBJECTIVE STATEMENT
Pt directed to ED for R/O ectopic pregnancy.  Kiswahili speaking pt (: Mark, #[id number]) states "I'm about 6 weeks and I want to make sure the pregnancy is normal because I miscarried my first."  .  Pt denies N/V/D, SOB, Fevers, CP and Dizziness.

## 2019-01-02 NOTE — ED PROVIDER NOTE - MEDICAL DECISION MAKING DETAILS
27 y/o f  presents 6 wks pregnant by dates for follow up of inappropriately rising hcg with no IUP noted; vss, no tenderness on exam, denies vb.  Will repeat hcg and u/s and discuss with GYN.

## 2019-01-02 NOTE — CONSULT NOTE ADULT - ASSESSMENT
27 yo  at 6w4d by LMP with pregnancy of unknown location. Pt has inappropriately rising bHCG multiple times: 3921 to 4298 to 5621. US now reveals 0.8cm intrauterine pregnancy without a fetal pole of yolk sac. No concern for ectopic pregnancy but concern for early pregnancy loss.     Pt understands this pregnancy can either be a failed pregnancy or early pregnancy. She will need to follow up with GYN here or in [place] for another repeat bHCG and TVUS on 18.  Pt to go to nearest ED if fever >100.4, severe abdominal pain or heavy vaginal bleeding. Pt discussed with Dr. Krishnamurthy.

## 2019-01-04 LAB
CULTURE RESULTS: SIGNIFICANT CHANGE UP
CULTURE RESULTS: SIGNIFICANT CHANGE UP
SPECIMEN SOURCE: SIGNIFICANT CHANGE UP
SPECIMEN SOURCE: SIGNIFICANT CHANGE UP

## 2019-01-20 ENCOUNTER — EMERGENCY (EMERGENCY)
Facility: HOSPITAL | Age: 29
LOS: 1 days | Discharge: HOME | End: 2019-01-20
Attending: EMERGENCY MEDICINE | Admitting: EMERGENCY MEDICINE
Payer: COMMERCIAL

## 2019-01-20 VITALS
OXYGEN SATURATION: 97 % | SYSTOLIC BLOOD PRESSURE: 104 MMHG | HEART RATE: 92 BPM | TEMPERATURE: 98 F | RESPIRATION RATE: 18 BRPM | DIASTOLIC BLOOD PRESSURE: 67 MMHG | WEIGHT: 110.23 LBS

## 2019-01-20 VITALS
DIASTOLIC BLOOD PRESSURE: 64 MMHG | RESPIRATION RATE: 18 BRPM | HEART RATE: 70 BPM | SYSTOLIC BLOOD PRESSURE: 101 MMHG | OXYGEN SATURATION: 98 % | TEMPERATURE: 98 F

## 2019-01-20 DIAGNOSIS — Z79.2 LONG TERM (CURRENT) USE OF ANTIBIOTICS: ICD-10-CM

## 2019-01-20 DIAGNOSIS — R10.30 LOWER ABDOMINAL PAIN, UNSPECIFIED: ICD-10-CM

## 2019-01-20 DIAGNOSIS — Z3A.09 9 WEEKS GESTATION OF PREGNANCY: ICD-10-CM

## 2019-01-20 DIAGNOSIS — O20.9 HEMORRHAGE IN EARLY PREGNANCY, UNSPECIFIED: ICD-10-CM

## 2019-01-20 DIAGNOSIS — Z79.899 OTHER LONG TERM (CURRENT) DRUG THERAPY: ICD-10-CM

## 2019-01-20 DIAGNOSIS — O26.851 SPOTTING COMPLICATING PREGNANCY, FIRST TRIMESTER: ICD-10-CM

## 2019-01-20 LAB
ALBUMIN SERPL ELPH-MCNC: 4.5 G/DL — SIGNIFICANT CHANGE UP (ref 3.3–5)
ALP SERPL-CCNC: 106 U/L — SIGNIFICANT CHANGE UP (ref 40–120)
ALT FLD-CCNC: 7 U/L — LOW (ref 10–45)
ANION GAP SERPL CALC-SCNC: 11 MMOL/L — SIGNIFICANT CHANGE UP (ref 5–17)
APPEARANCE UR: ABNORMAL
AST SERPL-CCNC: 14 U/L — SIGNIFICANT CHANGE UP (ref 10–40)
BASOPHILS NFR BLD AUTO: 0.1 % — SIGNIFICANT CHANGE UP (ref 0–2)
BILIRUB SERPL-MCNC: 0.5 MG/DL — SIGNIFICANT CHANGE UP (ref 0.2–1.2)
BILIRUB UR-MCNC: NEGATIVE — SIGNIFICANT CHANGE UP
BUN SERPL-MCNC: 10 MG/DL — SIGNIFICANT CHANGE UP (ref 7–23)
CALCIUM SERPL-MCNC: 9.8 MG/DL — SIGNIFICANT CHANGE UP (ref 8.4–10.5)
CHLORIDE SERPL-SCNC: 102 MMOL/L — SIGNIFICANT CHANGE UP (ref 96–108)
CO2 SERPL-SCNC: 24 MMOL/L — SIGNIFICANT CHANGE UP (ref 22–31)
COLOR SPEC: YELLOW — SIGNIFICANT CHANGE UP
CREAT SERPL-MCNC: 0.49 MG/DL — LOW (ref 0.5–1.3)
DIFF PNL FLD: NEGATIVE — SIGNIFICANT CHANGE UP
EOSINOPHIL NFR BLD AUTO: 5.8 % — SIGNIFICANT CHANGE UP (ref 0–6)
GLUCOSE SERPL-MCNC: 98 MG/DL — SIGNIFICANT CHANGE UP (ref 70–99)
GLUCOSE UR QL: NEGATIVE — SIGNIFICANT CHANGE UP
HCG SERPL-ACNC: 9245 MIU/ML — HIGH
HCT VFR BLD CALC: 35.8 % — SIGNIFICANT CHANGE UP (ref 34.5–45)
HGB BLD-MCNC: 11.9 G/DL — SIGNIFICANT CHANGE UP (ref 11.5–15.5)
KETONES UR-MCNC: NEGATIVE — SIGNIFICANT CHANGE UP
LEUKOCYTE ESTERASE UR-ACNC: NEGATIVE — SIGNIFICANT CHANGE UP
LYMPHOCYTES # BLD AUTO: 21.9 % — SIGNIFICANT CHANGE UP (ref 13–44)
MCHC RBC-ENTMCNC: 30.2 PG — SIGNIFICANT CHANGE UP (ref 27–34)
MCHC RBC-ENTMCNC: 33.2 G/DL — SIGNIFICANT CHANGE UP (ref 32–36)
MCV RBC AUTO: 90.9 FL — SIGNIFICANT CHANGE UP (ref 80–100)
MONOCYTES NFR BLD AUTO: 9 % — SIGNIFICANT CHANGE UP (ref 2–14)
NEUTROPHILS NFR BLD AUTO: 63.2 % — SIGNIFICANT CHANGE UP (ref 43–77)
NITRITE UR-MCNC: NEGATIVE — SIGNIFICANT CHANGE UP
PH UR: 6 — SIGNIFICANT CHANGE UP (ref 5–8)
PLATELET # BLD AUTO: 220 K/UL — SIGNIFICANT CHANGE UP (ref 150–400)
POTASSIUM SERPL-MCNC: 4 MMOL/L — SIGNIFICANT CHANGE UP (ref 3.5–5.3)
POTASSIUM SERPL-SCNC: 4 MMOL/L — SIGNIFICANT CHANGE UP (ref 3.5–5.3)
PROT SERPL-MCNC: 8 G/DL — SIGNIFICANT CHANGE UP (ref 6–8.3)
PROT UR-MCNC: 30 MG/DL
RBC # BLD: 3.94 M/UL — SIGNIFICANT CHANGE UP (ref 3.8–5.2)
RBC # FLD: 12.3 % — SIGNIFICANT CHANGE UP (ref 10.3–16.9)
SODIUM SERPL-SCNC: 137 MMOL/L — SIGNIFICANT CHANGE UP (ref 135–145)
SP GR SPEC: >=1.03 — SIGNIFICANT CHANGE UP (ref 1–1.03)
UROBILINOGEN FLD QL: 1 E.U./DL — SIGNIFICANT CHANGE UP
WBC # BLD: 7.4 K/UL — SIGNIFICANT CHANGE UP (ref 3.8–10.5)
WBC # FLD AUTO: 7.4 K/UL — SIGNIFICANT CHANGE UP (ref 3.8–10.5)

## 2019-01-20 PROCEDURE — 85025 COMPLETE CBC W/AUTO DIFF WBC: CPT

## 2019-01-20 PROCEDURE — 81001 URINALYSIS AUTO W/SCOPE: CPT

## 2019-01-20 PROCEDURE — 76817 TRANSVAGINAL US OBSTETRIC: CPT

## 2019-01-20 PROCEDURE — 80053 COMPREHEN METABOLIC PANEL: CPT

## 2019-01-20 PROCEDURE — 99284 EMERGENCY DEPT VISIT MOD MDM: CPT

## 2019-01-20 PROCEDURE — 76801 OB US < 14 WKS SINGLE FETUS: CPT

## 2019-01-20 PROCEDURE — 76801 OB US < 14 WKS SINGLE FETUS: CPT | Mod: 26

## 2019-01-20 PROCEDURE — 76817 TRANSVAGINAL US OBSTETRIC: CPT | Mod: 26

## 2019-01-20 PROCEDURE — 36415 COLL VENOUS BLD VENIPUNCTURE: CPT

## 2019-01-20 PROCEDURE — 99284 EMERGENCY DEPT VISIT MOD MDM: CPT | Mod: 25

## 2019-01-20 PROCEDURE — 76815 OB US LIMITED FETUS(S): CPT | Mod: 26

## 2019-01-20 PROCEDURE — 84702 CHORIONIC GONADOTROPIN TEST: CPT

## 2019-01-20 PROCEDURE — 87086 URINE CULTURE/COLONY COUNT: CPT

## 2019-01-20 RX ORDER — MISOPROSTOL 200 UG/1
4 TABLET ORAL
Qty: 4 | Refills: 1 | OUTPATIENT
Start: 2019-01-20

## 2019-01-20 NOTE — ED ADULT NURSE NOTE - CHPI ED NUR SYMPTOMS NEG
no vaginal discharge/no vomiting/no discharge/no coffee grounds emesis/no back pain/no fever/no nausea

## 2019-01-20 NOTE — ED ADULT NURSE NOTE - OBJECTIVE STATEMENT
Pt reports she is 8 weeks pregnant, has light bleeding, and suprapubic pain, denies n/v, weakness, dizziness, SOB, chest pain.

## 2019-01-20 NOTE — ED ADULT TRIAGE NOTE - CHIEF COMPLAINT QUOTE
Patient 8 weeks pregnant  c/o vaginal bleeding ( spotting) and abdominal pain started this morning . LMP 11/15/18 . .

## 2019-01-20 NOTE — CONSULT NOTE ADULT - ASSESSMENT
29yo  at 9w3d presenting with vaginal spotting, found to have persistent, nongrowing intrauterine gestational sac significant for pregnancy failure. No adnexal masses seen. Patient hemodynamically stable without bleeding. Options for management include expectant management, medical management with cytotec, or surgical management with suction curettage. Risks/benefits of each discussed. At this time, patient electing for medical management with cytotec. Side effects and anticipatory guidance given. Dose is cytotec 800mcg vaginally x1, with another dose in 24hrs if no response. All questions answered.    Plan of care discussed with Dr. Benjamin.

## 2019-01-20 NOTE — CONSULT NOTE ADULT - SUBJECTIVE AND OBJECTIVE BOX
29yo  at 9w3d by LMP 11/15/18 presenting for vaginal spotting and low abd cramps today. She has a known pregnancy but has not had viability diagnosed yet. Was here on  which u/s showed intrauterine gestational sac w/o fetal pole or yolk sac. Bhcg at that time 5620. Pt recently followed up with her outpatient gynecologist who said that she continues to have empty sac but BHCG ~9000. Denies lightheadedness, dizziness, nausea, vomiting.     OBHx:   G1 - SAB 2017    GYNHx: denies abnormal pap smears or STDs  PMH: none  PSH: none  Meds: none  All: NKDA     Physical Exam  Vital Signs Last 24 Hrs  T(C): 36.9 (2019 12:54), Max: 36.9 (2019 12:54)  T(F): 98.4 (2019 12:54), Max: 98.4 (2019 12:54)  HR: 87 (2019 12:54) (87 - 92)  BP: 103/66 (2019 12:54) (103/66 - 104/67)  BP(mean): --  RR: 18 (2019 12:54) (18 - 18)  SpO2: 98% (2019 12:54) (97% - 98%)    Gen: resting comfortably in no acute distress  Abd: soft ,nontender, nondistended, no rebound or guarding  Spec: cervix normal in appearance and closed, no bleeding or abnormal discharge  BME: anteverted 6w size uterus                          11.9   7.4   )-----------( 220      ( 2019 12:32 )             35.8         137  |  102  |  10  ----------------------------<  98  4.0   |  24  |  0.49<L>    Ca    9.8      2019 12:32    TPro  8.0  /  Alb  4.5  /  TBili  0.5  /  DBili  x   /  AST  14  /  ALT  7<L>  /  AlkPhos  106      HCG Quantitative, Serum (19 @ 12:32)    HCG Quantitative, Serum: 9245.0: HCG-Quantitative test interpretations: This test is intended only for the  detection & monitoring of pregnancy. For oncology studies order the tumor  marker test “HCG-TM” (hCG-Tumor Marker).  For pregnancy evaluation the reference values are as follows:  Negative:  <=4 mIU/mL  Indeterminate:  5 - 25 mIU/mL (suggest repeat testing in 72 hours)  Positive:  > 25 mIU/mL  Reference Values during Pregnancy:  Weeks after LMP* REFERENCE INTERVAL mIU/mL     3      6 - 71     4      10 - 750     5      220 - 7,100     6      160 - 32,000     7      3,700 - 164,000     8      32,000 - 150,000     9      64,000 - 151,000     10      47,000 - 187,000     12      28,000 - 211,000     14      14,000 - 63,000     15      12,000 - 71,000     16 - 18  8,000 - 58,000  * LMP:  Last Menstrual Period  HCG results of false positive and false negative for pregnancy are rare,  but can occur with this, and other, hCG tests. Carole- and post-menopausal  females may have mildly elevated hCG concentrations usually less than 14  mIU/mL that are constant over time. mIU/mL        ******PRELIMINARY REPORT******    ******PRELIMINARY REPORT******            EXAM:  US OB LES THAN 14 WKS 1ST GEST                          EXAM:  US OB TRANSVAGINAL                          PROCEDURE DATE:  2019    ******PRELIMINARY REPORT******    ******PRELIMINARY REPORT******              INTERPRETATION:  OBSTETRICAL ULTRASOUND - FIRST TRIMESTER dated 2019   1:58 PM    INDICATION: First trimester pregnancy with spotting and cramping. LMP:   11/15/2018. Beta-hC today, 5621 on 2019, and 4298 on   2018.    TECHNIQUE: Transabdominal views of the pelvis were obtained followed by   transvaginal views for better visualization of the endometrial cavity.      PRIOR STUDIES: Obstetrical ultrasounds from 2019 and 2018.    FINDINGS:   By dates, the estimated gestational age is 9 weeks 3 days.  A gestational sac is seen within the endometrial cavity measuring 1.1 cm   corresponding to a gestational age of 5 weeks 5 days. The gestational sac   measured 0.8 cm on 2018. No fetal pole or yolk sac is identified.    There is a small (<20%) subchronic hemorrhage. The cervix is closed with   a length of 4.0 cm.    The uterus is anteverted. The uterus is 7.2 x 4.0 x 8.0 cm. There is   again an arcuate uterine morphology.    The right ovary is normal in size, measuring 4.7 x 2.6 x 2.9 cm. There is   a 2.8 cm corpus luteum in the right ovary. No right adnexal masses are   seen. The left ovary is normal in size, measuring 2.5 x 1.2 x 1.3 cm. No   left adnexalmasses are seen.     Doppler evaluation demonstrates arterial and venous flow to both ovaries   with no evidence of torsion.    There is a small amount of free fluid in the cul-de-sac.      IMPRESSION:   1.  Compared to 2019, there has been minimal interval increase in   size of an intrauterine gestational sac and no interval development of an   embryo. Findings are consistent with pregnancy failure.  2.  Small pelvic free fluid.

## 2019-01-20 NOTE — ED PROVIDER NOTE - OBJECTIVE STATEMENT
29yo  at 9w3d by LMP 11/15/18 presenting for vaginal spotting and low abd cramps today. She has a known pregnancy but has not had viability diagnosed yet. Was here on  which u/s showed intrauterine gestational sac w/o fetal pole or yolk sac. Bhcg at that time 5620. Pt recently followed up with her outpatient gynecologist who said that she continues to have empty sac but BHCG ~9600. Denies lightheadedness, dizziness, nausea, vomiting. Came in today as had cramps again. Denies severe pain, heavy vaginal bleeding, is just spotting intermittently, not heavier than before.

## 2019-01-20 NOTE — ED PROVIDER NOTE - MEDICAL DECISION MAKING DETAILS
here w/ likely failed pregnancy. seen by gyn in the ED. plan for cytotec vs surgical management, pt to decide w/ her own GYN, rx sent for cytotec to her pharmacy. DC home in NAD with strict return precautions given.

## 2019-01-20 NOTE — ED PROVIDER NOTE - NSFOLLOWUPINSTRUCTIONS_ED_ALL_ED_FT
Please call your GYN to discuss medical versus surgical management of this failed pregnancy. We have sent the medication to the pharmacy if you decide you want to pursue medical management. Please follow up very closely with your GYN doctor.    MISCARRIAGE - General Information     Miscarriage    WHAT YOU NEED TO KNOW:    What is a miscarriage? A miscarriage is the loss of a fetus before 20 weeks of pregnancy. A miscarriage may also be called a spontaneous  or an early pregnancy loss.     What causes a miscarriage? The cause of your miscarriage may not be known. The following may increase the risk:     Being 35 years or older      Genetic problems in the fetus      Poorly controlled diabetes, high blood pressure, or thyroid disease      An infection such as toxoplasmosis or syphilis      Drinking alcohol or caffeine, smoking, or using drugs during pregnancy      Being overweight or underweight      Problems with the uterus, placenta, or cervix    What are the signs and symptoms of a miscarriage? You may not have symptoms of a miscarriage or you may have any of the following:     Vaginal spotting or heavy bleeding      Pain or cramping in your abdomen or lower back      Discharge of bloody fluid, tissue, or blood clots from your vagina      Nausea or vomiting      Dizziness    How is a miscarriage treated? You may not need treatment for a miscarriage. You may need any of the following if you have heavy bleeding or tissue left in your uterus after the miscarriage:     Medicine may be given to control bleeding and prevent infection. Medicine may also be given to control pain and prevent complications in future pregnancies.       Surgery may be needed to remove the tissue left in your uterus. Surgery may include a dilation and curettage (D&C) or a dilation and evacuation (D&E). Surgery may also be needed to control bleeding or prevent an infection.     How can I care for myself after a miscarriage?     Do not put anything in your vagina for 2 weeks or as directed. Do not use tampons, douche, or have sex. These actions can cause infection and pain.       Use sanitary pads as needed. You may have light bleeding or spotting for 2 weeks.       Do not take a bath or go swimming for 2 weeks or as directed. These actions may increase your risk for an infection. Take showers only.       Rest as needed. Slowly start to do more each day. Return to your daily activities as directed.       Talk to your healthcare provider about birth control. If you would like to prevent another pregnancy, ask your healthcare provider which type of birth control is best for you.       Join a support group or therapy to help you cope. A miscarriage may be very difficult for you, your partner, and other members of your family. There is no right way to feel after a miscarriage. You may feel overwhelming grief or other emotions. It may be helpful to talk to a friend, family member, or counselor about your feelings. You may worry that you could have another miscarriage. Talk to your healthcare provider about your concerns. He may be able to help you reduce the risk for another miscarriage. He may also help you find ways to cope with grief.     Where can I find more information?     The [employer]  [address] [address]  [place] [address]  Phone: 1-109.962.8182  Phone: 1-303.739.4455  Web Address: http://www.acog.org      [facility]  [address] [place] [address]  Web Address: http://www.[email or web address]      When should I seek immediate care?     You have foul-smelling drainage or pus coming from your vagina.      You have heavy vaginal bleeding and soak 1 pad or more in an hour.       You have severe abdominal pain.      You feel like your heart is beating faster than normal.       You feel extremely weak or dizzy.     When should I contact my healthcare provider?     You have a fever greater than 100.4°F or chills.       You have extreme sadness, grief, or feel unable to cope with what has happened.       You have questions or concerns about your condition or care.    CARE AGREEMENT:    You have the right to help plan your care. Learn about your health condition and how it may be treated. Discuss treatment options with your healthcare providers to decide what care you want to receive. You always have the right to refuse treatment.        © Copyright [employer] 2019 All illustrations and images included in CareNotes are the copyrighted property of [employer] or [employer].

## 2019-01-22 LAB
CULTURE RESULTS: SIGNIFICANT CHANGE UP
SPECIMEN SOURCE: SIGNIFICANT CHANGE UP

## 2019-10-19 NOTE — ED ADULT NURSE NOTE - PATIENT DISCHARGE SIGNATURE
[email or web address] 05-Dec-2017

## 2020-10-27 NOTE — ED ADULT NURSE NOTE - PMH
"  [facility].         " Occupational Therapy  Patient not seen in therapy today.     Spoke with PT. Per PT and chart, pt is at fxn'l baseline and independent with mobilities and self care tasks. Pt is up ad whit in room. No need for further skilled OT services through acute stay. Will d/c OT.          OBJECTIVE                                                                                                                              Documented in the chart in the following areas: Assessment. Plan.       No pertinent past medical history

## 2022-07-21 NOTE — ED ADULT TRIAGE NOTE - NS ED NURSE DIRECT TO ROOM YN
Post-Op Assessment Note    CV Status:  Stable  Pain Score: 0    Pain management: adequate     Mental Status:  Alert and awake   Hydration Status:  Euvolemic and stable   PONV Controlled:  Controlled   Airway Patency:  Patent and adequate      Post Op Vitals Reviewed: Yes      Staff: CRNA         No complications documented      /52 (07/21/22 1109)    Temp (!) 96 6 °F (35 9 °C) (07/21/22 1109)    Pulse 65 (07/21/22 1109)   Resp 16 (07/21/22 1109)    SpO2 99 % (07/21/22 1109) No

## 2025-06-03 NOTE — CONSULT NOTE ADULT - ATTENDING COMMENTS
Session ID: [id number]  Session Duration: Longer than 53 minutes  Language: Polish   ID: #[id number]   Name: Kamaljit
Given desired pregnancy and clinical stability plan for expectant management and return in 48 hours for beta.   Suspect fever from flu symptoms. Will treat appropriately